# Patient Record
Sex: FEMALE | Race: WHITE | NOT HISPANIC OR LATINO | Employment: PART TIME | URBAN - METROPOLITAN AREA
[De-identification: names, ages, dates, MRNs, and addresses within clinical notes are randomized per-mention and may not be internally consistent; named-entity substitution may affect disease eponyms.]

---
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
## 2022-04-25 ENCOUNTER — TELEPHONE (OUTPATIENT)
Dept: BEHAVIORAL HEALTH | Facility: CLINIC | Age: 22
End: 2022-04-25
Payer: COMMERCIAL

## 2022-04-25 NOTE — TELEPHONE ENCOUNTER
Left VM reminder for pt about their video appt that will be connect via email:  [email or web address] (per request on pt's appt note) for Wednesday 4/27/22 at 9 am.

## 2022-04-27 ENCOUNTER — VIRTUAL VISIT (OUTPATIENT)
Dept: PSYCHOLOGY | Facility: CLINIC | Age: 22
End: 2022-04-27
Attending: FAMILY MEDICINE
Payer: COMMERCIAL

## 2022-04-27 DIAGNOSIS — F98.8 ATTENTION DEFICIT DISORDER, UNSPECIFIED HYPERACTIVITY PRESENCE: Primary | ICD-10-CM

## 2022-04-27 PROCEDURE — 90834 PSYTX W PT 45 MINUTES: CPT | Mod: 95 | Performed by: PSYCHOLOGIST

## 2022-04-27 ASSESSMENT — COLUMBIA-SUICIDE SEVERITY RATING SCALE - C-SSRS
5. HAVE YOU STARTED TO WORK OUT OR WORKED OUT THE DETAILS OF HOW TO KILL YOURSELF? DO YOU INTEND TO CARRY OUT THIS PLAN?: NO
1. IN THE PAST MONTH, HAVE YOU WISHED YOU WERE DEAD OR WISHED YOU COULD GO TO SLEEP AND NOT WAKE UP?: NO
4. HAVE YOU HAD THESE THOUGHTS AND HAD SOME INTENTION OF ACTING ON THEM?: NO
6. HAVE YOU EVER DONE ANYTHING, STARTED TO DO ANYTHING, OR PREPARED TO DO ANYTHING TO END YOUR LIFE?: NO
3. HAVE YOU BEEN THINKING ABOUT HOW YOU MIGHT KILL YOURSELF?: NO
2. HAVE YOU ACTUALLY HAD ANY THOUGHTS OF KILLING YOURSELF IN THE PAST MONTH?: NO

## 2022-04-27 NOTE — PROGRESS NOTES
[facility]   Mental Health & Addiction Services     Progress Note - Initial Visit    Patient  Name:  Makeda Sanches Date: 2022         Service Type: Individual     Visit Start Time: 905  Visit End Time: 95    Visit #: 1    Attendees: Client    Service Modality:  Video Visit:      Provider verified identity through the following two step process.  Patient provided:  Patient     Telemedicine Visit: The patient's condition can be safely assessed and treated via synchronous audio and visual telemedicine encounter.      Reason for Telemedicine Visit: Services only offered telehealth    Originating Site (Patient Location): Patient's home    Distant Site (Provider Location): Provider Remote Setting- Home Office    Consent:  The patient/guardian has verbally consented to: the potential risks and benefits of telemedicine (video visit) versus in person care; bill my insurance or make self-payment for services provided; and responsibility for payment of non-covered services.     Patient would like the video invitation sent by:  My Chart    Mode of Communication:  Video Conference via [employer]    As the provider I attest to compliance with applicable laws and regulations related to telemedicine.       DATA:   Interactive Complexity: No   Crisis: No     Presenting Concerns/  Current Stressors:   ADHD Evaluation       ASSESSMENT:  Mental Status Assessment:  Appearance:   Appropriate   Eye Contact:   Good   Psychomotor Behavior: Normal   Attitude:   Cooperative   Orientation:   All  Speech   Rate / Production: Normal/ Responsive   Volume:  Normal   Mood:    Euthymic  Affect:    Appropriate   Thought Content:  Clear   Thought Form:  Coherent  Goal Directed   Insight:    Good       Safety Issues and Plan for Safety and Risk Management:     [place] Suicide Severity Rating Scale (Short Version)  [place] Suicide Severity Rating (Short Version) 2022   Q1 Wished to be Dead (Past Month) no   Q2  Suicidal Thoughts (Past Month) no   Q3 Suicidal Thought Method no   Q4 Suicidal Intent without Specific Plan no   Q5 Suicide Intent with Specific Plan no   Q6 Suicide Behavior (Lifetime) no   Level of Risk per Screen low risk     Patient denies current fears or concerns for personal safety.  Patient denies current or recent suicidal ideation or behaviors.  Patient denies current or recent homicidal ideation or behaviors.  Patient denies current or recent self injurious behavior or ideation.  Patient denies other safety concerns.  Recommended that patient call 911 or go to the local ED should there be a change in any of these risk factors.  Patient reports there are no firearms in the house.     Diagnostic Criteria:  Rule Out ADHD  WHODAS 2.0 (12 item):   WHODAS 2.0 Total Score 4/26/2022   Total Score 16   Total Score MyChart 16     Intervention:   During today s session, this writer outlined the expectations and purpose of the ADHD Evaluation. The client outlined their reason for referral and symptoms. This writer used the Adult ADHD Evaluation Intake Form to guide the clinical interview; it was finished and general background information was gathered including developmental, relational and chemical use. Their PHQ-9 and YARELY-7 scores were unavailable for review. A second session was scheduled to complete the clinical interview.   Collateral Reports Completed:  Routed note to Care Team Member(s)      PLAN: (Homework, other):  1. Provider will continue Diagnostic Assessment.  2.  Suicide Risk and Safety Concerns were assessed for Makeda Sanches.          Chari Camacho, PhD LP  April 27, 2022

## 2022-05-01 ENCOUNTER — HEALTH MAINTENANCE LETTER (OUTPATIENT)
Age: 22
End: 2022-05-01

## 2022-05-11 ENCOUNTER — VIRTUAL VISIT (OUTPATIENT)
Dept: PSYCHOLOGY | Facility: CLINIC | Age: 22
End: 2022-05-11
Payer: COMMERCIAL

## 2022-05-11 DIAGNOSIS — F98.8 ATTENTION DEFICIT DISORDER, UNSPECIFIED HYPERACTIVITY PRESENCE: Primary | ICD-10-CM

## 2022-05-11 PROCEDURE — 90791 PSYCH DIAGNOSTIC EVALUATION: CPT | Mod: 95 | Performed by: PSYCHOLOGIST

## 2022-06-26 ENCOUNTER — HEALTH MAINTENANCE LETTER (OUTPATIENT)
Age: 22
End: 2022-06-26

## 2022-07-01 ENCOUNTER — TELEPHONE (OUTPATIENT)
Dept: PSYCHOLOGY | Facility: CLINIC | Age: 22
End: 2022-07-01

## 2022-07-06 DIAGNOSIS — F98.8 ATTENTION DEFICIT DISORDER, UNSPECIFIED HYPERACTIVITY PRESENCE: ICD-10-CM

## 2022-07-06 DIAGNOSIS — F90.0 ATTENTION DEFICIT HYPERACTIVITY DISORDER (ADHD), PREDOMINANTLY INATTENTIVE TYPE: ICD-10-CM

## 2022-07-06 RX ORDER — DEXTROAMPHETAMINE SACCHARATE, AMPHETAMINE ASPARTATE MONOHYDRATE, DEXTROAMPHETAMINE SULFATE AND AMPHETAMINE SULFATE 2.5; 2.5; 2.5; 2.5 MG/1; MG/1; MG/1; MG/1
10 CAPSULE, EXTENDED RELEASE ORAL DAILY
Qty: 90 CAPSULE | Refills: 0 | Status: SHIPPED | OUTPATIENT
Start: 2022-07-06 | End: 2023-02-08

## 2022-07-06 RX ORDER — DEXTROAMPHETAMINE SACCHARATE, AMPHETAMINE ASPARTATE MONOHYDRATE, DEXTROAMPHETAMINE SULFATE AND AMPHETAMINE SULFATE 7.5; 7.5; 7.5; 7.5 MG/1; MG/1; MG/1; MG/1
30 CAPSULE, EXTENDED RELEASE ORAL DAILY
Qty: 90 CAPSULE | Refills: 0 | Status: SHIPPED | OUTPATIENT
Start: 2022-07-06 | End: 2023-02-08

## 2022-07-06 NOTE — TELEPHONE ENCOUNTER
Patient needs to be seen every 6 months for me to continue filling this medication.  Please help her set an appointment.  Thank you.

## 2022-07-11 ENCOUNTER — OFFICE VISIT (OUTPATIENT)
Dept: FAMILY MEDICINE | Facility: CLINIC | Age: 22
End: 2022-07-11
Payer: COMMERCIAL

## 2022-07-11 VITALS
SYSTOLIC BLOOD PRESSURE: 134 MMHG | HEIGHT: 67 IN | WEIGHT: 236.5 LBS | HEART RATE: 97 BPM | DIASTOLIC BLOOD PRESSURE: 77 MMHG | TEMPERATURE: 97.8 F | BODY MASS INDEX: 37.12 KG/M2 | RESPIRATION RATE: 16 BRPM

## 2022-07-11 DIAGNOSIS — Z79.899 ENCOUNTER FOR LONG-TERM (CURRENT) USE OF MEDICATIONS: ICD-10-CM

## 2022-07-11 DIAGNOSIS — E55.9 VITAMIN D DEFICIENCY: ICD-10-CM

## 2022-07-11 DIAGNOSIS — Z11.3 SCREENING FOR STDS (SEXUALLY TRANSMITTED DISEASES): ICD-10-CM

## 2022-07-11 DIAGNOSIS — F98.8 ATTENTION DEFICIT DISORDER, UNSPECIFIED HYPERACTIVITY PRESENCE: ICD-10-CM

## 2022-07-11 DIAGNOSIS — Z00.129 ENCOUNTER FOR ROUTINE CHILD HEALTH EXAMINATION W/O ABNORMAL FINDINGS: Primary | ICD-10-CM

## 2022-07-11 DIAGNOSIS — Z79.899 CONTROLLED SUBSTANCE AGREEMENT SIGNED: ICD-10-CM

## 2022-07-11 LAB — CREAT UR-MCNC: 390 MG/DL

## 2022-07-11 PROCEDURE — 99395 PREV VISIT EST AGE 18-39: CPT | Mod: 25 | Performed by: FAMILY MEDICINE

## 2022-07-11 PROCEDURE — 96127 BRIEF EMOTIONAL/BEHAV ASSMT: CPT | Performed by: FAMILY MEDICINE

## 2022-07-11 PROCEDURE — 87491 CHLMYD TRACH DNA AMP PROBE: CPT | Performed by: FAMILY MEDICINE

## 2022-07-11 PROCEDURE — 80307 DRUG TEST PRSMV CHEM ANLYZR: CPT | Performed by: FAMILY MEDICINE

## 2022-07-11 PROCEDURE — 99173 VISUAL ACUITY SCREEN: CPT | Mod: 59 | Performed by: FAMILY MEDICINE

## 2022-07-11 PROCEDURE — 87591 N.GONORRHOEAE DNA AMP PROB: CPT | Performed by: FAMILY MEDICINE

## 2022-07-11 PROCEDURE — 92551 PURE TONE HEARING TEST AIR: CPT | Performed by: FAMILY MEDICINE

## 2022-07-11 SDOH — ECONOMIC STABILITY: INCOME INSECURITY: IN THE LAST 12 MONTHS, WAS THERE A TIME WHEN YOU WERE NOT ABLE TO PAY THE MORTGAGE OR RENT ON TIME?: NO

## 2022-07-11 NOTE — LETTER
[facility]  07/11/22  Patient: Makeda Sanches  YOB: 2000  Medical Record Number: [id number]                                                                                  Non-Opioid Controlled Substance Agreement    This is an agreement between you and your provider regarding safe and appropriate use of controlled substances prescribed by your care team. Controlled substances are?medicines that can cause physical and mental dependence (abuse).     There are strict laws about having and using these medicines. We here at [facility] are  committed to working with you in your efforts to get better. To support you in this work, we'll help you schedule regular office appointments for medicine refills. If we must cancel or change your appointment for any reason, we'll make sure you have enough medicine to last until your next appointment.     As a Provider, I will:     Listen carefully to your concerns while treating you with respect.     Recommend a treatment plan that I believe is in your best interest and may involve therapies other than medicine.      Talk with you often about the possible benefits and the risk of harm of any medicine that we prescribe for you.    Assess the safety of this medicine and check how well it works.      Provide a plan on how to taper (discontinue or go off) using this medicine if the decision is made to stop its use.      ::  As a Patient, I understand controlled substances:       Are prescribed by my care provider to help me function or work and manage my condition(s).?    Are strong medicines and can cause serious side effects.       Need to be taken exactly as prescribed.?Combining controlled substances with certain medicines or chemicals (such as illegal drugs, alcohol, sedatives, sleeping pills, and benzodiazepines) can be dangerous or even fatal.? If I stop taking my medicines suddenly, I may have severe withdrawal symptoms.      The risks, benefits, and side effects of these medicine(s) were explained to me. I agree that:    1. I will take part in other treatments as advised by my care team. This may be psychiatry or counseling, physical therapy, behavioral therapy, group treatment or a referral to specialist.    2. I will keep all my appointments and understand this is part of the monitoring of controlled substances.?My care team may require an office visit for EVERY controlled substance refill. If I miss appointments or don t follow instructions, my care team may stop my medicine    3. I will take my medicines as prescribed. I will not change the dose or schedule unless my care team tells me to. There will be no refills if I run out early.      4. I may be asked to come to the clinic and complete a urine drug test or complete a pill count. If I don t give a urine sample or participate in a pill count, the care team may stop my medicine.    5. I will only receive controlled substance prescriptions from this clinic. If I am treated by another provider, I will tell them that I am taking controlled substances and that I have a treatment agreement with this provider. I will inform my [facility] care team within one business day if I am given a prescription for any controlled substance by another healthcare provider. My [facility] care team can contact other providers and pharmacists about my use of any medicines.    6. It is up to me to make sure that I don't run out of my medicines on weekends or holidays.?If my care team is willing to refill my prescription without a visit, I must request refills only during office hours. Refills may take up to 3 business days to process. I will use one pharmacy to fill all my controlled substance prescriptions. I will notify the clinic about any changes to my insurance or medicine availability.    7. I am responsible for my prescriptions. If the medicine/prescription is lost, stolen or  destroyed, it will not be replaced.?I also agree not to share controlled substance medicines with anyone.     8. I am aware I should not use any illegal or recreational drugs. I agree not to drink alcohol unless my care team says I can.     9. If I enroll in the [place] Medical Cannabis program, I will tell my care team before my next refill.    10. I will tell my care team right away if I become pregnant, have a new medical problem treated outside of my regular clinic, or have a change in my medicines.     11. I understand that this medicine can affect my thinking, judgment and reaction time.? Alcohol and drugs affect the brain and body, which can affect the safety of my driving. Being under the influence of alcohol or drugs can affect my decision-making, behaviors, personal safety and the safety of others. Driving while impaired (DWI) can occur if a person is driving, operating or in physical control of a car, motorcycle, boat, snowmobile, ATV, motorbike, off-road vehicle or any other motor vehicle ([place] Statute 169A.20). I understand the risk if I choose to drive or operate any vehicle or machinery.    I understand that if I do not follow any of the conditions above, my prescriptions or treatment may be stopped or changed.   I agree that my provider, clinic care team and pharmacy may work with any city, state or federal law enforcement agency that investigates the misuse, sale or other diversion of my controlled medicine. I will allow my provider to discuss my care with, or share a copy of, this agreement with any other treating provider, pharmacy or emergency room where I receive care.     I have read this agreement and have asked questions about anything I did not understand.    ________________________________________________________  Patient Signature - Makeda Sanches     ___________________                   Date     ________________________________________________________  Provider Signature - Anna  A Juan, MD       ___________________                   Date     ________________________________________________________  Witness Signature (required if provider not present while patient signing)          ___________________                   Date

## 2022-07-11 NOTE — PATIENT INSTRUCTIONS
Recommend 3 dairy servings a day or calcium with vitamin D twice a day with food.    Adderal in the am if needed.    Melatonin 2-3 mg at bedtime if needed for sleep.    No caffeine or Sudafed while on Adderall.    Tdap shot due June 2023.    Covid booster if you wish.    Set office visit in 6 months for rmed check.    Updated controlled substance agreement and urine tox for Adderall ER 30 mg +10 mg tab daily as needed.    Pap and breast exam next year with physical.       Patient Education    [employer] HANDOUT- PATIENT  18 THROUGH 21 YEAR VISITS  Here are some suggestions from [employer] experts that may be of value to your family.     HOW YOU ARE DOING  Enjoy spending time with your family.  Find activities you are really interested in, such as sports, theater, or volunteering.  Try to be responsible for your schoolwork or work obligations.  Always talk through problems and never use violence.  If you get angry with someone, try to walk away.  If you feel unsafe in your home or have been hurt by someone, let us know. Hotlines and community agencies can also provide confidential help.  Talk with us if you are worried about your living or food situation. Community agencies and programs such as SNAP can help.  Don t smoke, vape, or use drugs. Avoid people who do when you can. Talk with us if you are worried about alcohol or drug use in your family.    YOUR DAILY LIFE  Visit the dentist at least twice a year.  Brush your teeth at least twice a day and floss once a day.  Be a healthy eater.  Have vegetables, fruits, lean protein, and whole grains at meals and snacks.  Limit fatty, sugary, salty foods that are low in nutrients, such as candy, chips, and ice cream.  Eat when you re hungry. Stop when you feel satisfied.  Eat breakfast.  Drink plenty of water.  Make sure to get enough calcium every day.  Have 3 or more servings of low-fat (1%) or fat-free milk and other low-fat dairy products, such as yogurt and  cheese.  Women: Make sure to eat foods rich in folate, such as fortified grains and dark- green leafy vegetables.  Aim for at least 1 hour of physical activity every day.  Wear safety equipment when you play sports.  Get enough sleep.  Talk with us about managing your health care and insurance as an adult.    YOUR FEELINGS  Most people have ups and downs. If you are feeling sad, depressed, nervous, irritable, hopeless, or angry, let us know or reach out to another health care professional.  Figure out healthy ways to deal with stress.  Try your best to solve problems and make decisions on your own.  Sexuality is an important part of your life. If you have any questions or concerns, we are here for you.    HEALTHY BEHAVIOR CHOICES  Avoid using drugs, alcohol, tobacco, steroids, and diet pills. Support friends who choose not to use.  If you use drugs or alcohol, let us know or talk with another trusted adult about it. We can help you with quitting or cutting down on your use.  Make healthy decisions about your sexual behavior.  If you are sexually active, always practice safe sex. Always use birth control along with a condom to prevent pregnancy and sexually transmitted infections.  All sexual activity should be something you want. No one should ever force or try to convince you.  Protect your hearing at work, home, and concerts. Keep your earbud volume down.    STAYING SAFE  Always be a safe and cautious .  Insist that everyone use a lap and shoulder seat belt.  Limit the number of friends in the car and avoid driving at night.  Avoid distractions. Never text or talk on the phone while you drive.  Do not ride in a vehicle with someone who has been using drugs or alcohol.  If you feel unsafe driving or riding with someone, call someone you trust to drive you.  Wear helmets and protective gear while playing sports. Wear a helmet when riding a bike, a motorcycle, or an ATV or when skiing or skateboarding.  Always  use sunscreen and a hat when you re outside.  Fighting and carrying weapons can be dangerous. Talk with your parents, teachers, or doctor about how to avoid these situations.        Consistent with Bright Futures: Guidelines for Health Supervision of Infants, Children, and Adolescents, 4th Edition  For more information, go to https://brightfutures.aap.org.

## 2022-07-11 NOTE — PROGRESS NOTES
Makeda Sanches is 22 year old, here for a preventive care visit.    Assessment & Plan       ICD-10-CM    1. Encounter for routine child health examination w/o abnormal findings  Z00.129 BEHAVIORAL/EMOTIONAL ASSESSMENT (20276)     SCREENING TEST, PURE TONE, AIR ONLY     SCREENING, VISUAL ACUITY, QUANTITATIVE, BILAT   2. Screening for STDs (sexually transmitted diseases)  Z11.3 NEISSERIA GONORRHOEA PCR     CHLAMYDIA TRACHOMATIS PCR   3. Encounter for long-term (current) use of medications  Z79.899 [id number] - Urine Drug Confirmation Panel (Comprehensive)     [id number] - Urine Drug Confirmation Panel (Comprehensive)   4. Vitamin D deficiency  E55.9    5. Attention deficit disorder, unspecified hyperactivity presence  F98.8    6. Controlled substance agreement signed-CSA and urine tox done for Adderall XR 30 mg and 10 mg 7/11/2022  Z79.899      Recommend 3 dairy servings a day or calcium with vitamin D twice a day with food.    Adderal in the am if needed.    Melatonin 2-3 mg at bedtime if needed for sleep.    No caffeine or Sudafed while on Adderall.    Tdap shot due June 2023.    Covid booster if you wish.    Set office visit in 6 months for rmed check.    Updated controlled substance agreement and urine tox for Adderall ER 30 mg +10 mg tab daily as needed.    Pap and breast exam next year with physical.    Growth        Height: Normal , Weight: Obesity (BMI 95-99%)        Immunizations     Vaccines up to date.  MenB Vaccine series already completed.    Anticipatory Guidance    Reviewed age appropriate anticipatory guidance.   The following topics were discussed:  SOCIAL/ FAMILY:    Increased responsibility    Future plans/ College  NUTRITION:    Calcium   HEALTH / SAFETY:    Dental care    Seat belts    Firearms  SEXUALITY:    Safe sex/ STDs          Referrals/Ongoing Specialty Care  Verbal referral for routine dental care    Follow Up      Return in 1 year (on 7/11/2023) for Preventive Care visit.    Subjective     ADHD: Med does help her focus.  If she takes the med later in day hard to sleep.  Takes as needed.   If she has not taken the med in a few days it is sometimes simulating.  Does not drink caffeine.  She is currently undergoing a reassessment with psychiatry to ensure she still carries a diagnosis of ADHD and we are on the right treatment.      Additional Questions 7/11/2022   Do you have any questions today that you would like to discuss? No             Social 7/11/2022   Who do you live with? Family   Have you experienced any stressful events recently? None   In the past 12 months, has lack of transportation kept you from medical appointments or from getting medications? No   In the last 12 months, was there a time when you were not able to pay the mortgage or rent on time? No   In the last 12 months, was there a time when you did not have a steady place to sleep or slept in a shelter (including now)? No       Health Risks/Safety 7/11/2022   Do you always wear a seat belt? Yes   Do you wear a helmet for bicyle, rollerblades, skatebard, scooter, skiing/snowboarding, ATV/snowmobile, motorcycle?  (!) NO          TB Screening 7/11/2022   Since your last Well Child visit, have any of your family members or close contacts had tuberculosis or a positive tuberculosis test?  No   Since your last check-up, have you or any of your family members or close contacts traveled or lived outside of the [place]? No   Since your last check-up, have you lived in a high-risk group setting like a correctional facility, health care facility, homeless shelter, or refugee camp? No        Dyslipidemia Screening 7/11/2022   Have any of your parents or grandparents had a stroke or heart attack before age 55 for males or before age 65 for females? No   Do either of your parents have high cholesterol or currently taking medications to treat? (!) YES    Risk Factors: None    No flowsheet data found.  Dental Fluoride Varnish:   No,  parent/guardian declines fluoride varnish.  Reason for decline: Recent/Upcoming dental appointment  Diet 7/11/2022   Do you have questions about your eating?  No   Do you have questions about your weight?  No   What do you regularly drink? Water, Cow's Milk, (!) JUICE, (!) POP, (!) SPORTS DRINKS   What type of water? (!) BOTTLED   Do you think you eat healthy foods? Yes   Do you get at least 3 servings of food or beverages that have calcium each day (dairy, green leafy vegetables, etc.)? Yes   How would you describe your diet?  No restrictions   Within the past 12 months, you worried that your food would run out before you got money to buy more. Never true   Within the past 12 months, the food you bought just didn't last and you didn't have money to get more. Never true       Activity 7/11/2022   On average, how many days per week do you engage in moderate to strenuous exercise (like walking fast, running, jogging, dancing, swimming, biking, or other activities that cause a light or heavy sweat)? 4 days   On average, how many minutes do you engage in exercise at this level? 60 minutes   What do you do for exercise? Walking   What activities are you involved with? None     Media Use 7/11/2022   How many hours per day are you viewing a screen?  3 or 4     Sleep 7/11/2022   Do you have any trouble with sleep? (!) DIFFICULTY FALLING ASLEEP     Vision/Hearing 7/11/2022   Do you have any concerns about your hearing or vision?  (!) HEARING CONCERNS     Vision Screen  Vision Screen Details  Does the patient have corrective lenses (glasses/contacts)?: No  No Corrective Lenses, PLUS LENS REQUIRED: Pass  Vision Acuity Screen  Vision Acuity Tool: Saleem  RIGHT EYE: 10/10 (20/20)  LEFT EYE: 10/10 (20/20)  Is there a two line difference?: No  Vision Screen Results: Pass    Hearing Screen  RIGHT EAR  1000 Hz on Level 40 dB (Conditioning sound): Pass  1000 Hz on Level 20 dB: Pass  2000 Hz on Level 20 dB: Pass  4000 Hz on Level 20  "dB: Pass  6000 Hz on Level 20 dB: Pass  8000 Hz on Level 20 dB: Pass  LEFT EAR  8000 Hz on Level 20 dB: Pass  6000 Hz on Level 20 dB: Pass  4000 Hz on Level 20 dB: Pass  2000 Hz on Level 20 dB: Pass  1000 Hz on Level 20 dB: Pass  500 Hz on Level 25 dB: Pass  RIGHT EAR  500 Hz on Level 25 dB: Pass  Results  Hearing Screen Results: Pass      School 7/11/2022   Are you in school? Yes   What school do you attend?  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    AMB [facility] MENSES SECTION 7/11/2022   What are your periods like?  (!) IRREGULAR              Objective     Exam  /77 (BP Location: Left arm, Patient Position: Sitting, Cuff Size: Adult Large)   Pulse 97   Temp 97.8  F (36.6  C) (Oral)   Resp 16   Ht 1.702 m (5' 7\")   Wt 107.3 kg (236 lb 8 oz)   LMP 06/27/2022 (Approximate)   BMI 37.04 kg/m    Facility age limit for growth percentiles is 20 years.  Facility age limit for growth percentiles is 20 years.  Facility age limit for growth percentiles is 20 years.  Growth percentile SmartLinks can only be used for patients less than 20 years old.  Physical Exam  GENERAL: Active, alert, in no acute distress.  SKIN: Clear. No significant rash, abnormal pigmentation or lesions  HEAD: Normocephalic  EYES: Pupils equal, round, reactive, Extraocular muscles intact. Normal conjunctivae.  EARS: Normal canals. Tympanic membranes are normal; gray and translucent.  NOSE: Normal without discharge.  MOUTH/THROAT: Clear. No oral lesions. Teeth " without obvious abnormalities.  NECK: Supple, no masses.  No thyromegaly.  LYMPH NODES: No adenopathy  LUNGS: Clear. No rales, rhonchi, wheezing or retractions  HEART: Regular rhythm. Normal S1/S2. No murmurs. Normal pulses.  ABDOMEN: Soft, non-tender, not distended, no masses or hepatosplenomegaly. Bowel sounds normal.   NEUROLOGIC: No focal findings. Cranial nerves grossly intact: DTR's normal. Normal gait, strength and tone  BACK: Spine is straight, no scoliosis.  EXTREMITIES: Full range of motion, no deformities  : Deferred        Anna Martinez MD  [facility]

## 2022-07-12 LAB
C TRACH DNA SPEC QL NAA+PROBE: NEGATIVE
N GONORRHOEA DNA SPEC QL NAA+PROBE: NEGATIVE

## 2022-07-13 LAB
AMPHET UR CFM-MCNC: ABNORMAL NG/ML
AMPHET/CREAT UR: 2892 NG/MG {CREAT}

## 2022-07-27 ENCOUNTER — TELEPHONE (OUTPATIENT)
Dept: PSYCHOLOGY | Facility: CLINIC | Age: 22
End: 2022-07-27

## 2022-07-29 ENCOUNTER — VIRTUAL VISIT (OUTPATIENT)
Dept: PSYCHOLOGY | Facility: CLINIC | Age: 22
End: 2022-07-29
Payer: COMMERCIAL

## 2022-07-29 ENCOUNTER — FCC EXTENDED DOCUMENTATION (OUTPATIENT)
Dept: PSYCHOLOGY | Facility: CLINIC | Age: 22
End: 2022-07-29

## 2022-07-29 DIAGNOSIS — F90.2 ATTENTION DEFICIT HYPERACTIVITY DISORDER (ADHD), COMBINED TYPE: Primary | ICD-10-CM

## 2022-07-29 PROCEDURE — 96130 PSYCL TST EVAL PHYS/QHP 1ST: CPT | Mod: 95 | Performed by: PSYCHOLOGIST

## 2022-07-29 PROCEDURE — 96131 PSYCL TST EVAL PHYS/QHP EA: CPT | Mod: 95 | Performed by: PSYCHOLOGIST

## 2022-07-29 NOTE — CONFIDENTIAL NOTE
[facility]  Attention Deficit Hyperactivity Disorder Evaluation    Name: Makeda Sanches   : 2000    Examined By: Chari Camacho PsyD, LP    Sources of Information & Assessment Measures:    BFIS-LF: Other-Report, Completed by Shruthi Sanches (Sister), Date Unknown    BDEFS-LF: Other-Report, Completed by Shruthi Sanches (Sister), Date Unknown    BAARS-IV: Other-Report: Current Symptoms, Completed by Shruthi Sanches (Sister), Date Unknown     BAARS-IV: Other-Report: Childhood Symptoms, Completed by Shruthi Sanches (Sister), Date Unknown     CNS Vital Signs, Completed 2022    [facility], Medical Chart, Reviewed 2022    [place] Multiphasic Personality Inventory-2nd Edition, Administered 2022    Patient Health Questionnaire 9- Item Scale, Completed 2022    Generalized Anxiety Disorder 7-Item Scale, Completed 2022     BFIS-LF: Self-Report, Dated 2022    BDEFS-LF: Self-Report, Dated 2022    BAARS-IV: Self-Report: Current Symptoms, Dated 2022     BAARS-IV: Self-Report: Childhood Symptoms, Dated 2022    PROMIS Global-10, Completed 2022    Clinical Interviews, Conducted 2022 & 2022    World Health Organization Disability Assessment Schedule 2.0, Completed 2022    CAGE AID, Completed 2022    Identifying Information:  Ms. Sanches is a 21-year-old, woman; she spoke English, female pronouns were used, and she identified no cultural considerations for treatment. The clinical interviews took place via telemedicine due to the Corona Virus outbreak. This writer gathered her background information at the time of each session.     Client's Statement of Presenting Concern:  Ms. Sanches was referred for an Attention Deficit Hyperactivity Disorder Evaluation by Dr. Kate Acosta on 2021 by due to poor concentration and inattention. The purpose of the evaluation was to provide an opinion regarding possible mental health issues or  deficits and treatment recommendations.     History of Presenting Concern:  Ms. Sanches indicated she is not engaged in individual therapy yet has been prescribed Adderall. She asserted she has not been diagnosed with any mental health issues; a diagnostic assessment has not been completed. She acknowledged she was not doing well in her classes and there was  no explanation for it  so she and her provider wondered if she suffered from attention deficit. She stated she struggles to remember and complete tasks. She reported that symptoms impair her functioning at school and work. She highlighted that when she started taking stimulant medication her grades improved and she felt  more motivation to get things done.     Background Information:  Developmental History  Ms. Sanches was the second of a pair of twins born to her parents. She was raised by her parents in [place], [place] along with two younger sisters. She described her childhood as  pretty good.  She highlighted that  nothing very traumatic stands out.  She asserted that her parents seemed to have a  very good.  She denied witnessing incidents of domestic violence. She recalled playing with her sisters a lot and having  everything [she] could ask for.  She stated she felt loved and supported by her parents. She reported that when the girls misbehaved, they were sent to their rooms. She denied experiencing or witnessing childhood abuse.     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 She reported that the couple has been dating for over a year. She described the relationship as  pretty good.  She highlighted she has not had  anything negative happen  in the relationship. She denied incidents of domestic violence. She acknowledged she can be  really annoying and weird  at times, and he  puts up with it.      Relationships with Family Members  Ms. Sanches indicated she  continues to reside with her parents and youngest sister. She stated that her relationships with her parents have been  up and down  because they have set more expectations. She highlighted that her relationships with her sisters remain close. She asserted that her twin sister is currently enrolled at [employer].     Relationships with Peers  Ms. Sanches identified five individuals whom she calls a close friend. She asserted that her friends have all moved away for college, but she has contact with them daily via social media. She denied feeling lonely.     Educational & Occupational History  Ms. Sanches graduated from [employer] in the year 2020. She reported she earned  As and Bs with a few mixed in Cs.  She acknowledged her grades dropped slightly during the COVID-19 pandemic and classes were held virtually. She stated she struggled with social studies courses while she excelled in mathematics classes. She highlighted she enrolled in an honors [place] course and did well. She asserted she got along  pretty good  with teachers. She acknowledged difficulty getting along with peers and switching friend groups. She recalled not  agreeing with things they would do  and the relationships not being as close. She reported she was a member of the hockey and softball teams.     Following high school, Ms. Sanches planned to attend [employer]. She reported that when the time came to go,  it didn t feel like the right time.  She asserted she took a year off and worked as a  and . She indicated that in the fall of 2021, she enrolled at [employer]. She stated she lives at home. She reported that the first semester went  okay.  She recalled earnings As, Bs and Cs for grades. She acknowledged having difficulty getting to class and motivated to start homework. She disclosed she associated with peers but did not establish any new friendships. She indicated she did better  during the second semester because she liked her classes. She added that when she takes her medication, she gets up and goes to class as well as gets chores done at home. She stated she was taking generals and has not declared a major. She added she continues to work as a  part-time.     Mental Health History:  Ms. Sanches stated that anxiety symptoms emerged in high school. She recalled feeling anxious and worrying about not completing homework assignments. She asserted she  worries about money quite a bit  but not more than other college students. She denied symptoms of social anxiety or panic. She indicated she has never experienced a depressive episode. She reported no history of suicidal ideations or self-injurious behaviors.     Patient's Strengths and Limitations  Patient identified the following strengths or resources that will help them succeed in treatment: motivation. Things that may interfere with their success in treatment include mental health symptoms.     Health/Medical History:  Ms. Sanches described her physical health as average. She stated she has  high blood pressure sometimes.  She denied being diagnosed with a chronic medication condition or pain. She outlined no concerns regarding sexual or reproductive health. She acknowledged that her sleep is  not great.  She indicated she has difficulty falling asleep because she is playing on her phone and her mind is racing. She added that she has been experiencing sleep paralysis. She reported she feels rested upon waking. She admitted that her diet  could be better because a lot of it is take out.  She denied a history of disordered eating. She indicated she has cut caffeine with use of stimulant medication.     Patient does report a family history of mental health concerns. Patient reports family history includes Hypertension in her father and paternal grandmother; Hypothyroidism in her mother.      Patient reports current meds as         Medication Sig     amphetamine-dextroamphetamine (ADDERALL XR) 10 MG 24 hr capsule Take 1 capsule (10 mg) by mouth daily     amphetamine-dextroamphetamine (ADDERALL XR) 30 MG 24 hr capsule Take 1 capsule (30 mg) by mouth daily       Medication Adherence  Patient reports taking prescribed medications as prescribed.     Patient Allergies    No Known Allergies     Past Medical History   No past medical history on file.      Current Mental Status Exam:   Appearance:                 Appropriate    Eye Contact:                 Good   Psychomotor:               Normal       Gait / station:            Not assessed  Attitude / Demeanor:    Cooperative   Speech      Rate / Production:    Normal, Responsive      Volume:                    Normal      Language:                 Intact  Mood:                           Normal  Affect:                           Appropriate    Thought Content:         Clear   Thought Process:          Coherent, Goal Directed       Associations:            No loosening of associations  Insight:                          Good   Judgment:                     Intact   Orientation:                  All  Attention/concentration:         Good    Substance Use History:  Ms. Sanches stated she consumed alcohol and smoked cannabis for the first time at the age of 18 years old. As a high school roxanna and senior, she recalled drinking every other weekend. She indicated that her alcohol use decreased when her friends moved away. She denied a problematic pattern of alcohol use. She asserted she has smoked and ingested marijuana a handful of times. She reported she has never abused other illegal drugs or prescription medications. She added she vaped nicotine for a year but she since stopped.     CAGE AID  1. Have you felt you ought to cut down on your drinking or drug use? No   2. Have people annoyed you by criticizing your drinking or drug use? No   3. Have you felt bad or guilty about your drinking or drug use?  No   4. Have you ever had a drink or used drugs first thing in the morning to steady your nerves or to get rid of a hangover (eye opener)? No   CAGE-AID SCORE (range: 0 - 4) 0 (A total score of 2 or greater is considered clinically significant)     Trauma History:  Ms. Sanches denied witnessing or experiencing domestic violence, childhood abuse, a sexual assault, violent crime, or life-threatening event.     Risk Taking Behaviors:  Ms. Sanches reported a history of the following risk-taking behaviors: excessive spending.     Motor Vehicle Operation:  Ms. Sanches stated she was issued her 's license at the age of 18 years old. She denied ever being cited for speeding yet was cited for driving without insurance. She reported she easily gets lost and misses turns. She expressed the belief that others feel safe riding in the car with her while she is driving.      Safety Assessment:   Current Safety Concerns  Patient denies current homicidal ideation and behaviors.  Patient denies current self-injurious ideation and behaviors.    Patient denied risk behaviors associated with substance use.  Patient denies any high-risk behaviors associated with mental health symptoms.  Patient reports the following current concerns for their personal safety: None.  Patient reports there are not firearms in the house.          History of Safety Concerns  Patient denied a history of homicidal ideation.     Patient denied a history of personal safety concerns.    Patient denied a history of assaultive behaviors.    Patient denied a history of sexual assault behaviors.     Patient denied a history of risk behaviors associated with substance use.  Patient denies any history of high-risk behaviors associated with mental health symptoms.    Patient reports the following protective factors: forward or future oriented thinking; safe and stable environment; living with other people; structured day; effective problem-solving skills; commitment  to well-being; positive social skills; healthy fear of risky behaviors or pain     Risk Plan:  See Recommendations for Safety and Risk Management Plan     Review of Symptoms per patient report:  Depression:     No symptoms  Hollie:             No Symptoms  Psychosis:       No Symptoms  Anxiety:          Sleep disturbance and Ruminations  Panic:              No symptoms  Post-Traumatic Stress Disorder:  No Symptoms   Eating Disorder:          No Symptoms  ADHD:           Inattentive, Poor task completion, Distractibility, Forgetful, Impulsive, Restlessness/fidgety and Hyperverbal  Conduct Disorder:       No symptoms  Autism Spectrum Disorder:     No symptoms  Obsessive Compulsive Disorder:       No Symptoms     Patient reports the following compulsive behaviors and treatment history: Picking - has not had treatment.    Functional Status:  Ms. Sanches reported that symptoms have caused reduced functional status in the following areas: academic performance, educational activities, health maintenance, management of the household and or completion of tasks, money management, relationship(s), self-care, social interactions and work / vocational responsibilities.     Recommendations:   The client identified no relevant Evangelical, ethnic or cultural issues which may impact the assessment process.      services were not indicated.     Modifications to assist communication were not indicated.     A Release of Information was not needed at this time.     Report to child/adult protection services was not applicable.     The client will have open access to the mental health medical record.    The client was given self and collaborative rating scales to be completed prior to the second appointment. Depression and anxiety rating scales were completed. Copies of school records were not requested. Additional appointments will be scheduled as needed.     Assessment Measures:  World Health Organization Disability Assessment  Schedule 2.0   The WHODAS 2.0 short version is a 12-item measure that screens disability in adults age 18 years and older. Each self-administered item asks the individual to rate how much difficulty he or she has had in specific areas of functioning during the past 30 days.    In the past 30 days, how much difficulty did you have  in:    S1 Standing for long periods such as 30 minutes None   S2 Taking care of your household responsibilities None   S3 Learning a new task, for example, learning how to get to a new place? None     S4 Joining in community activities (for example, festivities, Jew or other activities) in the same way as anyone else can? None   S5 How much have you been emotionally affected by your health problems? Mild   S6 Concentrating on doing something for ten minutes? Moderate   S7 Walking a long distance such as a kilometer [or equivalent]? None   S8 Washing your whole body? None   S9 Getting dressed? None   S10 Dealing with people you do not know? None   S11 Maintaining a friendship? None   S12 Your day-to-day work? Mild   H1 Overall, in the past 30 days, how many days were these difficulties present? 10   H2 In the past 30 days, for how many days were you totally unable to carry out your usual activities or work because of any health condition? 0   H3 In the past 30 days, not counting the days that you were totally unable, for how many days did you cut back or reduce your usual activities or work because of any health condition? 0   WHODAS 2.0 12 Item scoring (range: 0 - 48) 16     PROMIS Global-10  Originally published in 2009 in Quality of Life Research as a subset of the (PROMIS), the PROMIS Global-10 is a gauge of general healthcare-related quality of life. It was developed by the [place] [employer] to evaluate HRQoL and is contrasted against [place] normative scores and was designed to be a  bottom-line  assessment of a patient s health that can be used  for a wide variety of diseases. The results of the questions are used to calculate two summary scores: a Global Physical Health Score and a Global Mental Health score. Higher scores are indicative of a healthier patient. The possible score ranges from 0 to 20 points in each case. 0 points represent the patient s most severe physical and/or mental impairment, while 20 points represent the best possible state of health.      In general, would you say your health is: Good   In general, would you say your quality of life is: Very good   In general, how would you rate your physical health? Good   In general, how would you rate your mental health, including your mood and your ability to think? Very good   In general, how would you rate your satisfaction with your social activities and relationships? Very good   In general, please rate how well you carry out your usual social activities and roles. (This includes activities at home, at work and in your community, and responsibilities as a parent, child, spouse, employee, friend, etc.) Good   To what extent are you able to carry out your everyday physical activities such as walking, climbing stairs, carrying groceries, or moving a chair? Completely   In the past 7 days    How often have you been bothered by emotional problems such as feeling anxious, depressed or irritable? Rarely   How would you rate your fatigue on average? Moderate   How would you rate your pain on average?   0 = No Pain  to  10 = Worst Imaginable Pain 0     Patient Health Questionnaire 9- Item Scale  The PHQ-9 is a multipurpose instrument for screening, diagnosing, monitoring and measuring the severity of depression. It incorporates the diagnostic criterion for a depressive disorder within a brief self-report tool.     Ms. Sanches completed the PHQ-9 on 06/22/2022. She earned score a score of 10 which placed her within the mild range. She reported experiencing little interest in activities, feeling  "tired/restless, trouble sleeping, poorly eating, having low self-esteem, and difficulty concentrating.      Generalized Anxiety Disorder 7-Item Scale   The YARELY-7 is a multipurpose instrument for screening, diagnosing, monitoring and measuring the severity of anxiety. It incorporates the diagnostic criterion for Generalized Anxiety Disorder yet is sensitive to other anxiety disorders within a brief self-report tool.       Ms. Sanches completed the YARELY-7 on 06/22/2022. She earned a score of 10 which placed her within the mild range. She reported feeling anxious, having difficulty controlling a variety of worries, trouble relaxing, and struggling to be still.      [place] Adult ADHD Rating Scale-IV: Self and Other Reports  The BAARS-IV assesses for symptoms of Attention Deficit Hyperactivity Disorder (ADHD) that are experienced in one's daily life. This assessment measure includes self and collateral rating scales designed to provide information regarding current and childhood symptoms of ADHD including inattention, hyperactivity, and impulsivity. Self-report scores are reported as percentiles. Scores at the 76th-83rd percentile are considered marginal, scores at the 84th-92nd percentile are considered borderline, scores at the 93rd-95th percentile are considered mild, scores at the 96th-98th percentile are considered moderate, and those at the 99th percentile are considered severe. Collateral or \"other\" rating scales are reported as number of symptoms observed in comparison to those reported by the client. Norms and percentile scores are not available for collateral reports.      Current Symptoms Scale- Self Report   Ms. Sanches completed the self-report inventory of current symptoms. Her Total ADHD Score was 48 which placed her in the 97th percentile for overall ADHD symptoms. She endorsed 5/9 Inattention symptoms, 5/9 Hyperactivity-Impulsivity symptoms, and 7/9 Sluggish Cognitive Tempo symptoms. She indicated that " symptoms have resulted in impaired functioning at school, home, and work.      Current Symptoms Scale- Other Report   Ms. Myron campbell sister completed the collateral inventory of current symptoms. The Total ADHD Score was 39 which placed her in the 93rd percentile for overall ADHD symptoms. Her sister endorsed 6/9 Inattention symptoms, 1/9 Hyperactivity-Impulsivity symptoms, and 7/9 Sluggish Cognitive Tempo symptoms. Ms. Myron campbell sister indicated that symptoms have impaired her functioning at school, home, and work.     Childhood Symptoms Scale- Self-Report  Ms. Sanches completed the self-report inventory of childhood symptoms. Her Total ADHD Score was 61 which placed her in the 99th percentile for overall ADHD symptoms in childhood. She endorsed 9/9 Inattention symptoms and 9/9 Hyperactivity-Impulsivity symptoms. Ms. Sanches indicated that symptoms emerged at the age of 9 years old and impaired her functioning at school and home as well as social relationships.        Childhood Scales- Other Report  Ms. Myron campbell sister completed the collateral inventory of childhood symptoms. The Total ADHD Score was 38 which placed her in the 85th percentile for overall ADHD symptoms. Her sister endorsed 7/9 Inattention symptoms and 0/9 Hyperactivity-Impulsivity symptoms. Ms. Myron campbell sister indicated that symptoms emerged when she was 10 years old and impaired her functioning at school and home.                          [place] Functional Impairment Scale: Self and Other Reports (BFIS-LF)  The BFIS is used to assess an individuals' psychosocial impairment in major life/daily activities that may be due to a mental health disorder. This assessment measure includes self and collateral rating scales. Self-report scores are reported as percentiles. Scores at the 76th-83rd percentile are considered marginal, scores at the 84th-92nd percentile are considered borderline, scores at the 93rd-95th percentile are considered mild, scores at the  "96th-98th percentile are considered moderate, and those at the 99th percentile are considered severe. Collateral or \"other\" rating scales are reported as number of symptoms observed in comparison to those reported by the client. Norms and percentile scores are not available for collateral reports.      Ms. Sanches earned a Mean Impairment Score of 5.6 (93rd percentile). She reported significant deficits in the areas of home-family, home-chores, work, participating in educational activities, money management, driving, self-care, and maintaining health. Her sister completed the collateral form; the mean score was not clinically significant. She outlined significant deficits in the areas of engaging in community activities, participating in educational activities, work, self-care and maintaining health.      [place] Deficits in Executive Functioning Scale (BDEFS)  The BDEFS is a measure used for evaluating dimensions of adult executive functioning in daily life. This assessment measure includes self and collateral rating scales. Self-report scores are reported as percentiles. Scores at the 76th-83rd percentile are considered marginal, scores at the 84th-92nd percentile are considered borderline, scores at the 93rd-95th percentile are considered mild, scores at the 96th-98th percentile are considered moderate, and those at the 99th percentile are considered severe. Collateral or \"other\" rating scales are reported as number of symptoms observed in comparison to those reported by the client. Norms and percentile scores are not available for collateral reports.     Ms. Sanches earned a Total Executive Functioning Score of 224 (97th percentile). The ADHD-Executive Functioning Index score was 26 (96th percentile). She reported significant deficits in the areas of time management, organization, problem solving, and motivation. Her sister completed the collateral form; the total score was not clinically significant. She outlined " significant deficits in the areas of time management and motivation.    [place] Multiphasic Personality Inventory-2nd Edition  First developed in the 1930's, the [place] Multiphasic Personality Inventory is a complex psychological instrument designed to measure personality characteristics of adults including mental illnesses, behaviors, thought patterns, strengths, and weaknesses. Several validity scales have been incorporated into the test in order to measure respondents  approach to the testing environment. In addition, ten standard clinical sub-scales are used to identify problem areas, problem intensity, and behaviors associated with identified characteristics. The MMPI-II, the most recent version of the instrument, was refined in the 1990's and adds additional strengths in terms of validity analysis and additional clinical sub-scales It should be noted the MMPI-II is regarded as one aspect only of a thorough diagnostic assessment and it was not normed with a standardized population including Native Americans.      Ms. Sanches was remotely administered the MMPI-2 on 07/06/2022 through the [facility]. The validity scales indicated the protocol was valid. The clinical, restructured, content, supplementary and PSY-5 scales fell at or below average.     CNS Vital Signs Neurocognitive Battery  The CNS Vital Signs Neurocognitive Battery is a remotely-administered assessment comprised of seven core subtests to individually measure the patient's verbal memory, visual memory, motor speed, psychomotor speed, reaction time, focus, ability to sustain attention and ability to adapt to changing rules and tasks.     Ms. Sanches was remotely administered the CNS Vital Signs on 07/28/2022 through the [facility]. She was instructed not to take her stimulant medication prior to the test and asserted she followed this  directive. The results are detailed below. Of note, above average domain scores indicate a standard score (SS) greater than 109 or a Percentile Rank (NH) greater than 74, indicating a high functioning test subject. Average is a SS  or NH 25-74, indicating normal function. Low Average is a SS 80-89 or NH 9-24 indicating a slight deficit or impairment. Below Average is a SS 70-79 or NH 2-8, indicating a moderate level of deficit or impairment. Very Low is a SS less than 70 or a NH less than 2, indicating a deficit and impairment. Validity Indicator denotes a guideline for representing the possibility of an invalid test or domain score, and can be influenced by patient understanding, effort, or other conditions. To confirm a diagnosis of ADHD, focus is given to the Complex Attention, Cognitive Flexibility, Processing Speed, Executive Function and Simple Attention scales which are most sensitive to the condition.       The Neurocognitive Index (NCI) measures an average score derived from the domain scores or a general assessment of the overall neurocognitive status of the patient. Ms. Sanches earned an NCI score of 94 (34th percentile) which placed her within the average range.     The Composite Memory scale measures how well subject can recognize, remember, and retrieve words and geometric figures, and is comprised of the Visual and Verbal Memory domains. Ms. Sanches earned a Composite Memory score of 111 (77th percentile) which placed her within the above average range.     The Verbal Memory scale measures how well subject can recognize, remember, and retrieve words. Ms. Sanches earned a Verbal Memory score of 89 (23rd percentile) which placed her within the low average range.    The Visual Memory scale measures how well subject can recognize, remember and retrieve geometric figures. Ms. Sanches earned a visual memory score of 124 (95th percentile) which placed her within the above average range.    The Psychomotor  Speed scale measures how well a subject perceives, attends, responds to complex visual-perceptual information and performs simple fine motor coordination, and is comprised of the Motor Speed and Processing Speed indexes. Ms. Sanches earned a Psychomotor Speed score of 103 (58th percentile) which placed her within the average range.     The Reaction Time scale measures how quickly the subject can react, in milliseconds, to a simple and increasingly complex direction set. Ms. Sanches s Reaction Time score was 92 (30th percentile) which placed her within the average range.     The Complex Attention scale measures the ability to track and respond to a variety of stimuli over lengthy periods of time and/or perform complex mental tasks requiring vigilance quickly and accurately. Ms. Sanches earned a Complex Attention score of 89 (23rd percentile) which placed her within the low average range.     The Cognitive Flexibility scale measures how well subject can adapt to rapidly changing and increasingly complex set of directions and/or to manipulate the information. Ms. Sanches s Cognitive Flexibility score was 73 (4th percentile) which placed her within the low range.     The Processing Speed scale measures how well a subject recognizes and processes information i.e., perceiving, attending/responding to incoming information, motor speed, fine motor coordination, and visual-perceptual ability. Ms. Sanches s Processing Speed score was 102 (55th percentile) which placed her within the average range.     The Executive Function scale measures how well a subject recognizes rules, categories, and manages or navigates rapid decision making. Ms. Sanches s Executive Function was 75 (5th percentile) which placed her within the low range.    The Simple Attention scale measures the ability to track and respond to a single defined stimulus over lengthy periods of time while performing vigilance and response inhibition quickly and accurately to  a simple task. Ms. Sanches s Simple Attention score was 96 (40th percentile) which placed her in the average range.     The Motor Speed scale measures the ability to perform simple movements to produce and satisfy an intention towards a manual action and goal. Ms. Myron campbell Motor Speed score was 100 (50th percentile) which placed her within the average range.     Domain Standard Score Percentile Description Validity   Neurocognitive Index 94 34 Average Yes   Composite Memory Measure 111 77 Above average Yes   Verbal Memory 89 23 Low average Yes   Visual Memory 124 95 Above average Yes   Psychomotor Speed 103 58 Average Yes   Reaction Time 92 30 Average Yes   Complex Attention 89 23 Low average Yes   Cognitive Flexibility 73 4 Low Yes   Processing Speed 102 55 Average Yes   Executive Function 75 5 Low Yes   Simple Attention 96 40 Average Yes   Motor Speed 100 50 Average Yes     Diagnostic Impressions:  Attention Deficit Hyperactivity Disorder, Combined Type   Ms. Sanches met criterion for ADHD. She recalled symptoms emerging at the age of 9 years old. She indicated she had difficulty organizing, cleaning her room, finishing chores, starting and completing homework, keeping track of her stuff, and independently initiating her work. Ms. Sanches s Childhood Symptoms forms from the Brisa Reports, as completed by her and her sister, were mixed. She outlined clinically significant impairment from ADHD symptoms in childhood, but her sister did not. In adulthood, Ms. Sanches indicated she has been struggling to focus and complete tasks at home and work. She acknowledged that at work she is often late, is easily bored and distracted, and poorly manages time. The results of the remaining Brisa Reports as completed by her and her sister also were mixed. She reported clinically significant impairment from ADHD symptoms in all areas of functioning while her sister outlined impairment in current functioning only. Ms. Sanches s level  of cognitive functioning was estimated to fall in the average to high average range yet not formally assessed. The results of the CNS Vital Signs showed clinically significant deficits in the areas of complex attention, cognitive flexibility, and executive functioning.     Recommendations:    1. Ms. Sanches likely would benefit from receiving supportive services, tutoring services, and accommodations through the post-secondary institution of their choice.    a. Ms. Sanches would benefit from taking exams alone, in a quiet room.  b. Ms. Sanches would benefit from extended exam time.  c. Ms. Sanches would benefit from note taking devices/services or audio recording of lectures.  d. Ms. Sanches would benefit from use of a fidget device or the ability move around the classroom as long as they are not disturbing others.  e. Ms. Sanches would benefit from extensions on course work due dates.  f. Ms. Sanches may benefit from student support for coping with a disability in college.  g. Ms. Sanches may benefit from help with time management and organizational skills.  h. Ms. Sanches may benefit from assistance with advocating to his instructors and departments, their needs, and accommodations.     2. Ms. Sanches would likely benefit from continuing to engage in medication management services. They were recommended to follow their provider s treatment guidelines and recommendations. If their diagnoses are beyond the scope of practice for their primary care provider, they should be recommended to a psychiatric provider.    3. Ms. Sanches may wish to reference the Coping with Attention-Deficit/Hyperactivity Disorder handout made available by [facility] for helpful tips for adults struggling with the disorder.    4. Ms. Sanches may wish to participate in cognitive testing to further assess her attention, working memory and processing speed.    5. Ms. Sanches likely would benefit from engaging in individual therapy.      Psychological Testing Bill/Service Summary:  Interview/Assessment Date Time   Interview 4/27/2022; 5/11/[phone] (.75); [id number] (.5) Previously billed   Documentation 4/27/2022; 5/11/[phone] (.25); [id number] (.5) Previously billed   Testing Evaluation 64803- 1st 60 mins 07926- each add 60   Record Review & Clarify Referral Question     Clinical Decision Making     Patient Symptom Management     Battery Modification     Integration/ Report Generation 7/27/2022 4723-0727 (1.5)   Feedback Session 7/29/2022 0850-1722 (.25)   Post-Service Work 7/29/2022 9150-0692 (.5)   Total Time 2.25    Total Units 1 1   Test Administration 38709- 1st 30 mins 27876- each add 30    Test Administration (Face)     Scoring     Total Time 0    Total Units 0    Diagnoses ADHD

## 2022-07-29 NOTE — PROGRESS NOTES
[facility]                                     Progress Note    Patient Name: Makeda Sanches  Date: 2022         Service Type: Individual      Session Start Time: 100  Session End Time:      Session Length: 17    Session #: 3    Attendees: Client    Service Modality:  Video Visit:      Provider verified identity through the following two step process.  Patient provided:  Patient     Telemedicine Visit: The patient's condition can be safely assessed and treated via synchronous audio and visual telemedicine encounter.      Reason for Telemedicine Visit: Services only offered telehealth    Originating Site (Patient Location): Patient's home    Distant Site (Provider Location): Provider Remote Setting- Home Office    Consent:  The patient/guardian has verbally consented to: the potential risks and benefits of telemedicine (video visit) versus in person care; bill my insurance or make self-payment for services provided; and responsibility for payment of non-covered services.     Patient would like the video invitation sent by:  My Chart    Mode of Communication:  Video Conference via [employer]    As the provider I attest to compliance with applicable laws and regulations related to telemedicine.    DATA  Interactive Complexity: No  Crisis: No        Progress Since Last Session (Related to Symptoms / Goals / Homework):   Symptoms: No change stable    Homework: None      Episode of Care Goals: Satisfactory progress - PREPARATION (Decided to change - considering how); Intervened by negotiating a change plan and determining options / strategies for behavior change, identifying triggers, exploring social supports, and working towards setting a date to begin behavior change     Current / Ongoing Stressors and Concerns:   Ms. Sanches asserted that she has had a busy summer, working, spending time with friends, and traveling. She reported no change in mood symptoms. She denied significant  "stressors. She indicated she has been taking Adderrall when working or doing school work and she noticed a difference. She highlighted is \"a lot more motivated\" and stays on task.      Treatment Objective(s) Addressed in This Session:   Review findings of ADHD Evaluation including testing results, diagnoses and recommendations.      Intervention:   During today's session, Ms. Sanches was provided with feedback regarding his ADHD Evaluation. This writer outlined the contents of the report Ms. Sanches informed him of the results of the symptom screenings. When administered the PHQ-9 and YARELY-7, Ms. Sanches s scores fell in the mild range. She denied current mood symptoms or stressors. This writer reviewed the results of the MMPI-2. The validity scales indicated the protocol was valid. The clinical, restructured, content, supplementary and PSY-5 scales fell at or below average. She did not meet criterion for a mood, anxiety or trauma disorder.     This writer also discussed the results of the Brisa Reports. Ms. Sanches s Childhood Symptoms forms from the Brisa Reports as completed by her and her sister were mixed. She outlined clinically significant impairment from ADHD but her sister did not. The results of the remaining Brisa Reports as completed by her and her sister also were mixed. She reported clinically significant impairment from ADHD symptoms in all areas of functioning while her sister outlined impairment in current functioning only. When administered the CNS Vital Signs, she demonstrated deficits in complex attention, cognitive flexibility, and executive functioning. She met criterion for ADHD. This writer reviewed recommendations and answered questions.    Assessments completed prior to visit:  CNS Vital Signs  Domain Standard Score Percentile Description Validity   Neurocognitive Index 94 34 Average Yes   Composite Memory Measure 111 77 Above average Yes   Verbal Memory 89 23 Low average Yes   Visual " Memory 124 95 Above average Yes   Psychomotor Speed 103 58 Average Yes   Reaction Time 92 30 Average Yes   Complex Attention 89 23 Low average Yes   Cognitive Flexibility 73 4 Low Yes   Processing Speed 102 55 Average Yes   Executive Function 75 5 Low Yes   Simple Attention 96 40 Average Yes   Motor Speed 100 50 Average Yes     PHQ9:   PHQ-9 SCORE 12/9/2020   PHQ-A Total Score 0     GAD7: No flowsheet data found.      ASSESSMENT: Current Emotional / Mental Status (status of significant symptoms):   Risk status (Self / Other harm or suicidal ideation)   Patient denies current fears or concerns for personal safety.   Patient denies current or recent suicidal ideation or behaviors.   Patient denies current or recent homicidal ideation or behaviors.   Patient denies current or recent self injurious behavior or ideation.   Patient denies other safety concerns.   Patient reports there has been no change in risk factors since their last session.     Patient reports there has been no change in protective factors since their last session.     Recommended that patient call 911 or go to the local ED should there be a change in any of these risk factors.     Appearance:   Appropriate    Eye Contact:   Fair    Psychomotor Behavior: Normal    Attitude:   Cooperative    Orientation:   All   Speech    Rate / Production: Normal     Volume:  Normal    Mood:    Normal Euthymic   Affect:    Appropriate    Thought Content:  Clear    Thought Form:  Coherent  Logical    Insight:    Good  and Fair      Medication Review:   No changes to current psychiatric medication(s)     Medication Compliance:   Yes     Changes in Health Issues:   None reported     Chemical Use Review:   Substance Use: Chemical use reviewed, no active concerns identified      Tobacco Use: No current tobacco use.      Diagnosis:  1. Attention deficit hyperactivity disorder (ADHD), combined type      Attention Deficit Hyperactivity Disorder, Combined Type   Ms. Sanches met  criterion for ADHD. She recalled symptoms emerging at the age of 9 years old. She indicated she had difficulty organizing, cleaning her room, finishing chores, starting and completing homework, keeping track of her stuff, and independently initiating her work. Ms. Sanches s Childhood Symptoms forms from the Brisa Reports, as completed by her and her sister, were mixed. She outlined clinically significant impairment from ADHD symptoms in childhood, but her sister did not. In adulthood, Ms. Sanches indicated she has been struggling to focus and complete tasks at home and work. She acknowledged that at work she is often late, is easily bored and distracted, and poorly manages time. The results of the remaining Brisa Reports as completed by her and her sister also were mixed. She reported clinically significant impairment from ADHD symptoms in all areas of functioning while her sister outlined impairment in current functioning only. Ms. Sanches s level of cognitive functioning was estimated to fall in the average to high average range yet not formally assessed. The results of the CNS Vital Signs showed clinically significant deficits in the areas of complex attention, cognitive flexibility, and executive functioning.     Collateral Reports Completed:   Routed note to Care Team Member(s)    PLAN: (Patient Tasks / Therapist Tasks / Other)  1. Ms. Sanches likely would benefit from receiving supportive services, tutoring services, and accommodations through the post-secondary institution of their choice.    a. Ms. Sanches would benefit from taking exams alone, in a quiet room.  b. Ms. Sanches would benefit from extended exam time.  c. Ms. Sanches would benefit from note taking devices/services or audio recording of lectures.  d. Ms. Sanches would benefit from use of a fidget device or the ability move around the classroom as long as they are not disturbing others.  e. Ms. Sanches would benefit from extensions on course work due  dates.  f. Ms. Sanches may benefit from student support for coping with a disability in college.  g. Ms. Sanches may benefit from help with time management and organizational skills.  h. Ms. Sanches may benefit from assistance with advocating to his instructors and departments, their needs, and accommodations.     2. Ms. Sanches would likely benefit from continuing to engage in medication management services. They were recommended to follow their provider s treatment guidelines and recommendations. If their diagnoses are beyond the scope of practice for their primary care provider, they should be recommended to a psychiatric provider.    3. Ms. Sanches may wish to reference the Coping with Attention-Deficit/Hyperactivity Disorder handout made available by [facility] for helpful tips for adults struggling with the disorder.    4. Ms. Sanches may wish to participate in cognitive testing to further assess her attention, working memory and processing speed.    5. Ms. Sanches likely would benefit from engaging in individual therapy.         Chari Camacho, PhD LP  July 29, 2022      Psychological Testing Bill/Service Summary:  Interview/Assessment Date Time   Interview 4/27/2022; 5/11/[id number] (.75); [id number] (.5) Previously billed   Documentation 4/27/2022; 5/11/2022 951-1005 (.25); 8409-9149 (.5) Previously billed   Testing Evaluation 50489- 1st 60 mins 93304- each add 60   Record Review & Clarify Referral Question     Clinical Decision Making     Patient Symptom Management     Battery Modification     Integration/ Report Generation 7/27/2022 7326-9095 (1.5)   Feedback Session 7/29/2022 9103-3732 (.25)   Post-Service Work 7/29/2022 3413-2604 (.5)   Total Time 2.25    Total Units 1 1   Test Administration 10334- 1st 30 mins 77059- each add 30    Test Administration (Face)     Scoring     Total Time 0    Total Units 0    Diagnoses ADHD

## 2022-11-21 ENCOUNTER — HEALTH MAINTENANCE LETTER (OUTPATIENT)
Age: 22
End: 2022-11-21

## 2023-02-13 ENCOUNTER — OFFICE VISIT (OUTPATIENT)
Dept: FAMILY MEDICINE | Facility: CLINIC | Age: 23
End: 2023-02-13
Payer: COMMERCIAL

## 2023-02-13 VITALS
DIASTOLIC BLOOD PRESSURE: 100 MMHG | OXYGEN SATURATION: 100 % | HEART RATE: 101 BPM | BODY MASS INDEX: 41.52 KG/M2 | RESPIRATION RATE: 16 BRPM | TEMPERATURE: 97.8 F | SYSTOLIC BLOOD PRESSURE: 149 MMHG | HEIGHT: 67 IN | WEIGHT: 264.5 LBS

## 2023-02-13 DIAGNOSIS — E66.813 CLASS 3 SEVERE OBESITY DUE TO EXCESS CALORIES WITH SERIOUS COMORBIDITY AND BODY MASS INDEX (BMI) OF 40.0 TO 44.9 IN ADULT (H): ICD-10-CM

## 2023-02-13 DIAGNOSIS — Z79.899 CONTROLLED SUBSTANCE AGREEMENT SIGNED: ICD-10-CM

## 2023-02-13 DIAGNOSIS — F90.0 ATTENTION DEFICIT HYPERACTIVITY DISORDER (ADHD), PREDOMINANTLY INATTENTIVE TYPE: ICD-10-CM

## 2023-02-13 DIAGNOSIS — E66.01 CLASS 3 SEVERE OBESITY DUE TO EXCESS CALORIES WITH SERIOUS COMORBIDITY AND BODY MASS INDEX (BMI) OF 40.0 TO 44.9 IN ADULT (H): ICD-10-CM

## 2023-02-13 DIAGNOSIS — F98.8 ATTENTION DEFICIT DISORDER, UNSPECIFIED HYPERACTIVITY PRESENCE: Primary | ICD-10-CM

## 2023-02-13 DIAGNOSIS — R03.0 ELEVATED BP WITHOUT DIAGNOSIS OF HYPERTENSION: ICD-10-CM

## 2023-02-13 PROCEDURE — 99214 OFFICE O/P EST MOD 30 MIN: CPT | Performed by: FAMILY MEDICINE

## 2023-02-13 RX ORDER — DEXTROAMPHETAMINE SACCHARATE, AMPHETAMINE ASPARTATE MONOHYDRATE, DEXTROAMPHETAMINE SULFATE AND AMPHETAMINE SULFATE 2.5; 2.5; 2.5; 2.5 MG/1; MG/1; MG/1; MG/1
10 CAPSULE, EXTENDED RELEASE ORAL DAILY
Qty: 90 CAPSULE | Refills: 0 | Status: SHIPPED | OUTPATIENT
Start: 2023-02-13 | End: 2023-07-15

## 2023-02-13 RX ORDER — DEXTROAMPHETAMINE SACCHARATE, AMPHETAMINE ASPARTATE MONOHYDRATE, DEXTROAMPHETAMINE SULFATE AND AMPHETAMINE SULFATE 7.5; 7.5; 7.5; 7.5 MG/1; MG/1; MG/1; MG/1
30 CAPSULE, EXTENDED RELEASE ORAL DAILY
Qty: 90 CAPSULE | Refills: 0 | Status: SHIPPED | OUTPATIENT
Start: 2023-02-13 | End: 2023-07-15

## 2023-02-13 NOTE — PROGRESS NOTES
Assessment & Plan       ICD-10-CM    1. Attention deficit disorder, unspecified hyperactivity presence  F98.8 amphetamine-dextroamphetamine (ADDERALL XR) 30 MG 24 hr capsule      2. Controlled substance agreement signed-CSA and urine tox done for Adderall XR 30 mg and 10 mg 7-11-22  Z79.899       3. Elevated BP without diagnosis of hypertension  R03.0       4. Class 3 severe obesity due to excess calories with serious comorbidity and body mass index (BMI) of 40.0 to 44.9 in adult (H)  E66.01 Comprehensive Weight Management    Z68.41       5. Attention deficit hyperactivity disorder (ADHD), predominantly inattentive type  F90.0 amphetamine-dextroamphetamine (ADDERALL XR) 10 MG 24 hr capsule        Set physical in 6 months.    We will review the Adderall at that time and also renew the controlled substance agreement and urine tox then.    Blood pressure elevated today at 149/100 on the left arm, 155/83 on the right arm.    We will watch this closely.  We will help set a blood pressure check here in 2 to 3 months.    Recommend low-salt diet.    I am so glad you are exercising 7 days a week that is great!    Start tracking calories and possibly micro macro new nutrients if you are able there is an nanette called my fitness pal.  Typically we have people try to keep calories to 3085-2397 a day.    I am placing referral to our comprehensive weight management program at the [facility].  The phone number will be in your after visit summary and please call them to set the appointment.  Not a bad idea to check with insurance at this clinic is covered.    I sent a 90 day refill of your Adderall prescriptions.  I asked the pharmacist to cancel out the previous 30-day prescription that was sent on February 9.  A good week or 2 before you need your next prescription please send a [employer] message and we will fill that.    Prescription monitoring program reviewed and patient following the controlled substance agreement.      30  "minutes spent on the date of the encounter doing chart review, history and exam, documentation and further activities per the note       BMI:   Estimated body mass index is 41.43 kg/m  as calculated from the following:    Height as of this encounter: 1.702 m (5' 7\").    Weight as of this encounter: 120 kg (264 lb 8 oz).   Weight management plan: Patient referred to endocrine and/or weight management specialty Discussed healthy diet and exercise guidelines        No follow-ups on file.    Anna Martinez MD  [facility]    Ryan Lombardi is a 22 year old, presenting for the following health issues:  A.D.H.D (ADHD follow up, discuss weight loss options)      A.D.H.D    History of Present Illness       Reason for visit:  Medication check    She eats 0-1 servings of fruits and vegetables daily.She consumes 1 sweetened beverage(s) daily.She exercises with enough effort to increase her heart rate 60 or more minutes per day.  She exercises with enough effort to increase her heart rate 4 days per week. She is missing 3 dose(s) of medications per week.  She is not taking prescribed medications regularly due to other.     ADD: She is not having any side effects from medication such as palpitations or not sleeping.  She does feel it is very helpful for her concentration with school.  She did have a psychologist visit within the last year or so.    Elevated BP: She said she does not have a diagnosis of hypertension and would like to monitor this for now.  She feels she is a little more salt than usual in the last day or 2.    Obesity:  Exercising 1 hour with [employer] 3-4 days a week.  Gets 54632 steps other days.  Not tracking calories, but trying to stay to 2 meals a day.  Trying not to eat as much fast food and making more food at home.  Working on lower carb diet.  With the [employer] bell does watch body fat measurments has lost a couple of inches around her abdomen.  Has lost a few " "pounds.  She would be agreeable to a referral to the comprehensive weight loss program.          Review of Systems         Objective    BP (!) 149/100 (BP Location: Left arm, Patient Position: Sitting, Cuff Size: Adult Large)   Pulse 101   Temp 97.8  F (36.6  C) (Oral)   Resp 16   Ht 1.702 m (5' 7\")   Wt 120 kg (264 lb 8 oz)   LMP 02/13/2023   SpO2 100%   BMI 41.43 kg/m    Body mass index is 41.43 kg/m .  Physical Exam   GENERAL: healthy, alert and no distress  RESP: lungs clear to auscultation - no rales, rhonchi or wheezes  CV: regular rate and rhythm, normal S1 S2, no S3 or S4, no murmur, click or rub, no peripheral edema and peripheral pulses strong                    "

## 2023-02-13 NOTE — PATIENT INSTRUCTIONS
Set physical in 6 months.    We will review the Adderall at that time and also renew the controlled substance agreement and urine tox then.    Blood pressure elevated today at 149/100 on the left arm, 155/83 on the right arm.    We will watch this closely.  We will help set a blood pressure check here in 2 to 3 months.    Recommend low-salt diet.    I am so glad you are exercising 7 days a week that is great!    Start tracking calories and possibly micro macro new nutrients if you are able there is an nanette called my fitness pal.  Typically we have people try to keep calories to 1131-4825 a day.    I am placing referral to our comprehensive weight management program at the [facility].  The phone number will be in your after visit summary and please call them to set the appointment.  Not a bad idea to check with insurance at this clinic is covered.    I sent a 90 day refill of your Adderall prescriptions.  I asked the pharmacist to cancel out the previous 30-day prescription that was sent on February 9.  A good week or 2 before you need your next prescription please send a [employer] message and we will fill that.    Prescription monitoring program reviewed and patient following the controlled substance agreement.

## 2023-05-30 ENCOUNTER — OFFICE VISIT (OUTPATIENT)
Dept: SURGERY | Facility: CLINIC | Age: 23
End: 2023-05-30
Attending: FAMILY MEDICINE
Payer: COMMERCIAL

## 2023-05-30 VITALS
DIASTOLIC BLOOD PRESSURE: 86 MMHG | BODY MASS INDEX: 41.75 KG/M2 | HEIGHT: 67 IN | WEIGHT: 266 LBS | SYSTOLIC BLOOD PRESSURE: 138 MMHG

## 2023-05-30 DIAGNOSIS — E66.01 CLASS 3 SEVERE OBESITY DUE TO EXCESS CALORIES WITH SERIOUS COMORBIDITY AND BODY MASS INDEX (BMI) OF 40.0 TO 44.9 IN ADULT (H): Primary | ICD-10-CM

## 2023-05-30 DIAGNOSIS — L30.9 ECZEMA, UNSPECIFIED TYPE: ICD-10-CM

## 2023-05-30 DIAGNOSIS — E66.813 CLASS 3 SEVERE OBESITY DUE TO EXCESS CALORIES WITH SERIOUS COMORBIDITY AND BODY MASS INDEX (BMI) OF 40.0 TO 44.9 IN ADULT (H): Primary | ICD-10-CM

## 2023-05-30 DIAGNOSIS — R53.83 OTHER FATIGUE: ICD-10-CM

## 2023-05-30 DIAGNOSIS — L68.0 HIRSUTISM: ICD-10-CM

## 2023-05-30 DIAGNOSIS — E28.2 PCOS (POLYCYSTIC OVARIAN SYNDROME): ICD-10-CM

## 2023-05-30 DIAGNOSIS — J30.2 SEASONAL ALLERGIC RHINITIS, UNSPECIFIED TRIGGER: ICD-10-CM

## 2023-05-30 PROBLEM — E66.9 OBESITY: Status: ACTIVE | Noted: 2023-05-30

## 2023-05-30 PROCEDURE — 99215 OFFICE O/P EST HI 40 MIN: CPT | Performed by: FAMILY MEDICINE

## 2023-05-30 RX ORDER — CYANOCOBALAMIN (VITAMIN B-12) 2500 MCG
2500 TABLET, SUBLINGUAL SUBLINGUAL DAILY
Qty: 90 TABLET | Refills: 3 | Status: SHIPPED | OUTPATIENT
Start: 2023-05-30

## 2023-05-30 RX ORDER — SEMAGLUTIDE 1 MG/.5ML
1 INJECTION, SOLUTION SUBCUTANEOUS WEEKLY
Qty: 2 ML | Refills: 0 | Status: SHIPPED | OUTPATIENT
Start: 2023-07-30 | End: 2023-08-25

## 2023-05-30 RX ORDER — SEMAGLUTIDE 0.25 MG/.5ML
0.25 INJECTION, SOLUTION SUBCUTANEOUS WEEKLY
Qty: 2 ML | Refills: 0 | Status: SHIPPED | OUTPATIENT
Start: 2023-05-30 | End: 2023-06-27

## 2023-05-30 RX ORDER — METFORMIN HCL 500 MG
500 TABLET, EXTENDED RELEASE 24 HR ORAL 2 TIMES DAILY WITH MEALS
Qty: 180 TABLET | Refills: 3 | Status: SHIPPED | OUTPATIENT
Start: 2023-05-30 | End: 2024-05-13

## 2023-05-30 RX ORDER — SEMAGLUTIDE 0.5 MG/.5ML
0.5 INJECTION, SOLUTION SUBCUTANEOUS WEEKLY
Qty: 2 ML | Refills: 0 | Status: SHIPPED | OUTPATIENT
Start: 2023-06-30 | End: 2023-06-06

## 2023-05-30 ASSESSMENT — SLEEP AND FATIGUE QUESTIONNAIRES
HOW LIKELY ARE YOU TO NOD OFF OR FALL ASLEEP WHILE SITTING AND READING: MODERATE CHANCE OF DOZING
HOW LIKELY ARE YOU TO NOD OFF OR FALL ASLEEP WHILE SITTING AND TALKING TO SOMEONE: WOULD NEVER DOZE
HOW LIKELY ARE YOU TO NOD OFF OR FALL ASLEEP WHILE SITTING INACTIVE IN A PUBLIC PLACE: WOULD NEVER DOZE
HOW LIKELY ARE YOU TO NOD OFF OR FALL ASLEEP WHEN YOU ARE A PASSENGER IN A CAR FOR AN HOUR WITHOUT A BREAK: SLIGHT CHANCE OF DOZING
HOW LIKELY ARE YOU TO NOD OFF OR FALL ASLEEP WHILE WATCHING TV: SLIGHT CHANCE OF DOZING
HOW LIKELY ARE YOU TO NOD OFF OR FALL ASLEEP WHILE LYING DOWN TO REST IN THE AFTERNOON WHEN CIRCUMSTANCES PERMIT: MODERATE CHANCE OF DOZING
HOW LIKELY ARE YOU TO NOD OFF OR FALL ASLEEP IN A CAR, WHILE STOPPED FOR A FEW MINUTES IN TRAFFIC: WOULD NEVER DOZE
HOW LIKELY ARE YOU TO NOD OFF OR FALL ASLEEP WHILE SITTING QUIETLY AFTER LUNCH WITHOUT ALCOHOL: MODERATE CHANCE OF DOZING

## 2023-05-30 NOTE — PATIENT INSTRUCTIONS
[facility]    Remember to:    -Eat 3 meals a day (not 2, not 5) Chew your food well/SLOW down  -Eat your protein first  -Be a water drinker/Minize liquid calories (no regular pop, no juice) skim or 1% milk OK  -Sleep 7-8 hours each night. Address sleep if problematic  -Stress management is important. Address if problematic  -Move-8000 steps daily Muscle: maintain your muscle mass (strength training 2X/wk)  -Wheat, not white (bread, pasta, crackers, suresh, bagels, tortillas, rice)  -Limit restaurant, cafeteria, take out, drive through to 2 times per week or less  -Minimize caffeine, alcohol, and night-time snacking  -Consider keeping a food diary (i.e. My Fitness Pal, Lose It, or other food tracker)  -Follow up with the dietitian      **Some lean proteins: chicken, turkey, tuna, salmon, crab, fish, shrimp, scallops, lobster, lean cuts of beef and pork, luncheon meats, veggie burgers, beans (black, lima, garbanzo, cole, kidney, refried), chile, cottage cheese, string cheese, other cheese, eggs, tofu, peanut butter, nuts, vegan crumbles, greek yogurt       MEDICATIONS FOR WEIGHT LOSS    PHENTERMINE (Adipex): approved in 1959 for appetite suppression.  It has stimulant effects and cannot be used with Ritalin, Concerta, or other stimulants.  It is not addictive although it's chemically related to amphetamines.  Amphetamines are addictive. The most common side effects are dry mouth, increased energy and concentration, increased pulse, and constipation.  You should not take phentermine if you have glaucoma, hyperthyroidism, or uncontrolled/untreated hypertension.  $24-$30 for 90 tablets    PHENDIMETRAZINE (Bontril): Appetite suppressant/sympathomimetic.  Controlled substance.  Side effects and contraindications similar to phentermine.  $45-$60 for 3 month supply    TOPIRAMATE (Topamax): Anti-seizure medication, also used to prevent migraines.  Side effects include paresthesia, glaucoma, altered  concentration, attention difficulties, memory and speech problems, metabolic acidosis, depression, increase in body temperature and decrease sweating, kidney stones, and weight loss.  Do not take Topamax while taking Depakote as this can cause high ammonia levels.  You must have reliable birth control as Topamax can cause birth defects.  Discontinue slowly to avoid seizure.  Insurance usually covers Topiramate.    QSYMIA (Phentermine + Topamax):  See above information about phentermine and Topamax.  Most common side effects are paresthesia, dizziness, distortion of taste, insomnia, constipation, and dry mouth.  $150-$220 per month    DIETHYLPROPION (Tenuate): Sympathomimetic amine.  Appetite suppressant.  Doses 25 mg before meals or 75 mg per day.  Most common side effects are hypertension, palpitations, EKG changes, and increased seizures in epileptics.  There can be a possible adverse reaction with alcohol.  $70-$90 per 3 months    XENICAL(Orlistat) (Graham OTC): Approved in 1999.  A fat-blocker.  It reduces absorption of fat by approximately 30%.  It has beneficial effects on lipid levels.  Side effects include diarrhea, abdominal cramping, fecal incontinence, oily spotting, and flatus with discharge.  Side effects are minimized if the patient limits their dietary fat to no more than 30% of their diet.  Patients must take a multivitamin daily to avoid vitamin D, E, A, and K deficiency.  $120 per month    CONTRAVE (Naltrexone/Bupropion): Approved in 2014.  It is a combination pill including an opioid receptor blocker and a long-standing antidepressant.  Most common side effects include nausea, constipation, headache, vomiting, dizziness, trouble sleeping, dry mouth, and diarrhea.  With all antidepressants watch for mood changes and suicide ideation.  Bupropion has been known to lower the seizure threshold in those prone to seizures.  It should not be used in a patient with a recent history of bulimia. It has been  associated with liver damage from taking higher than recommended doses.  Do not use countrave if you have taken opioid medications or opioid street drugs in the past 7-10 days, if you are currently on opioids, methadone, or if you are pregnant.  Do not use contrave if you have recently stopped using alcohol or benzodiazepines.  Taper off contrave slowly.  Dosing: titrate up to 2 tablets twice daily of the Naltrexone 8 mg/ Bupropion 90 mg tablets.  $200 for 90 tablets    SAXENDA (Liraglutide): A daily injectable (3mg daily) medication used for type 2 diabetes (Victoza). Glucagon-like peptide-1 (GLP-1) agonist. Contraindications include personal or family history of medullary thyroid cancer or MEN type 2. Acute pancreatitis has been observed in patients taking liraglutide. Liraglutide causes C-cell tumors in rats and mice. It is unknown whether liraglutide causes tumors in humans. Start at 0.6mg, increasing the dose weekly up to 3mg.     TRULICITY (Dulaglutide): A weekly injectable (4.5mg weekly) medication used for type 2 diabetes. Glucagon-like peptide-1(GLP-1) agonist. Contraindications include personal or family history of medullary thyroid cancer or MEN type 2. Acute pancreatitis has been observed in patients taking liraglutide. Dulaglutide causes C-cell tumors in rats and mice. It is unknown whether liraglutide causes tumors in humans. Start at 0.75 mg, 1.5 mg, 3.0 mg, to 4.5 mg increasing the dose monthly.      VYVANSE (Lisdexamfetamine dimesylate): a CNS stimulant used to treat ADHD. Indicated for the treatment of moderate to severe Binge Eating Disorder in Adults. Contraindicated in patients with known heart disease, structural abnormalities of the heart, serious heart arrhythmias or unexplained syncope. CNS stimulants such as vyvanse may cause manic or psychotic symptoms in patients with BPAD or pre-existing psychosis. Use with caution in patients with Raynaud's phenomenon. Most common side effects include  dry mouth, insomnia, decreased appetite, increased heart rate, jittery feeling, constipation and anxiety.     WEGOVY (Semaglutide): A weekly injectable (2.4mg weekly) medication used for type 2 diabetes (Ozempic). Glucagon-like peptide-1 (GLP-1) agonist. Contraindications include personal or family history of medullary thyroid cancer or MEN type 2. Acute pancreatitis has been observed in patients taking Semaglutide. Semaglutide causes C-cell tumors in rats and mice. It is unknown whether Semaglutide causes tumors in humans. Start at 0.25mg, increasing to 0.5, 1.0, 1.7 then 2.4 monthly.     MOUNJARO (Tirzepatide): A weekly injectable medication indicated for type 2 diabetes. Glucagon-like-peptide-1 (GLP-1) agonist and Glucose-Dependent Insulinotropic Polypeptide (GIP) agonist. Contraindications include personal or family history of medullary thyroid cancer or MEN type 2. Acute pancreatitis has been observed in patients taking Tirzepatide. Tirzepatide causes C-cell tumors in rats and mice. It is unknown whether Tirzepatide causes tumors in humans. Watch for neck mass, difficulty swallowing, persistent hoarseness, and epigastric abdominal pain. Most common side effects include nausea, diarrhea, vomiting, constipation, dyspepsia, and abdominal pain. Start at 2.5mg, increasing to 5.0, 7.5, 10, 12.5 then 15mg monthly.

## 2023-05-30 NOTE — PROGRESS NOTES
"    New Medical Weight Management Consult    PATIENT:  Makeda Sanches  MRN:         [id number]  :         2000  PATTIE:         2023    Dear Dr. Martinez,    I had the pleasure of seeing your patient, Makeda Sanches. Full intake/assessment was done to determine barriers to weight loss success and develop a treatment plan. Makeda Sanches is a 22 year old female interested in treatment of medical problems associated with excess weight. She has a height of 5' 7\", a weight of 266 lbs 0 oz, and the calculated Body mass index is 41.66 kg/m .    ASSESSMENT/PLAN:  Insulin resistance/hirsutism by history and physical  Declines labs d/t needle phobia.   Start metformin and add B-12 as metformin can make B-12 go low  Wegovy (appears to be a covered benefit)  Dietitian  Discussed insulin resistance and importance of protecting sleep, stress management, daily activity, and eating protein with each meal to keep lean muscle mass, lose body fat thus optimizing metabolism and improving insulin sensitivity    She has the following co-morbidities:        2023     6:52 PM   --   I have the following health issues associated with obesity High Blood Pressure   I have the following symptoms associated with obesity Back Pain    Fatigue            View : No data to display.                    2023     6:52 PM   Referring Provider   Please name the provider who referred you to Medical Weight Management  If you do not know, please answer \"I Don't Know\" [email or web address]           2023     6:52 PM   Weight History   How concerned are you about your weight? Very Concerned   I became overweight In College   The following factors have contributed to my weight gain Change in Schedule    Eating Wrong Types of Food    Eating Too Much    Lack of Exercise    Genetic (Runs in the Family)    Stress   I have tried the following methods to lose weight Watching Portions or Calories    Exercise   My lowest weight since age 20 " was 200   My highest weight since age 20 was 260   The most weight I have ever lost was (lbs) 30   I have the following family history of obesity/being overweight My mother is overweight    My father is overweight   How has your weight changed over the last year? Gained   How many pounds? 30           5/21/2023     6:52 PM   Diet Recall Review with Patient   If you do eat lunch, what types of food do you typically eat? usually some type of fast food or a sandwich i make at home   If you do eat supper, what types of food do you typically eat? 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 0   How many of glasses of milk do you drink in a typical day? 1   If you do drink milk, what type? Skim   How many 8oz glasses of sugar containing drinks such as Parag-Aid/sweet tea do you drink in a day? 0   How many cans/bottles of sugar pop/soda/tea/sports drinks do you drink in a day? 1   How many cans/bottles of diet pop/soda/tea or sports drink do you drink in a day? 0   How often do you have a drink of alcohol? Monthly or Less   If you do drink, how many drinks might you have in a day? 1 or 2           5/21/2023     6:52 PM   Eating Habits   Generally, my meals include foods like these bread, pasta, rice, potatoes, corn, crackers, sweet dessert, pop, or juice A Few Times a Week   Generally, my meals include foods like these fried meats, brats, burgers, french fries, pizza, cheese, chips, or ice cream Almost Everyday   Eat fast food (like McDonalds, Burger Lukasz, Taco Bell) A Few Times a Week   Eat at a buffet or sit-down restaurant Once a Week   Eat most of my meals in front of the TV or computer Almost Everyday   Often skip meals, eat at random times, have no regular eating times Almost Everyday   Rarely sit down for a meal but snack or graze throughout Less Than  Weekly   Eat extra snacks between meals Less Than Weekly   Eat most of my food at the end of the day A Few Times a Week   Eat in the middle of the night or wake up at night to eat Less Than Weekly   Eat extra snacks to prevent or correct low blood sugar Never   Eat to prevent acid reflux or stomach pain Less Than Weekly   Worry about not having enough food to eat Never   I eat when I am depressed Never   I eat when I am stressed Less Than Weekly   I eat when I am bored Almost Everyday   I eat when I am anxious Never   I eat when I am happy or as a reward A Few Times a Week   I feel hungry all the time even if I just have eaten Once a Week   Feeling full is important to me Almost Everyday   I finish all the food on my plate even if I am already full Almost Everyday   I can't resist eating delicious food or walk past the good food/smell Almost Everyday   I eat/snack without noticing that I am eating Less Than Weekly   I eat when I am preparing the meal A Few Times a Week   I eat more than usual when I see others eating Never   I have trouble not eating sweets, ice cream, cookies, or chips if they are around the house Almost Everyday   I think about food all day Never   What foods, if any, do you crave? Chips/Crackers   Please list any other foods you crave? fried food           5/21/2023     6:52 PM   Amount of Food   I feel out of control when eating Monthly   I eat a large amount of food, like a loaf of bread, a box of cookies, a pint/quart of ice cream, all at once Monthly   I eat a large amount of food even when I am not hungry Monthly   I eat rapidly Weekly   I eat alone because I feel embarrassed and do not want others to see how much I have eaten Never   I eat until I am uncomfortably full Almost Everyday   I feel bad, disgusted, or guilty after I overeat Almost Everyday           5/21/2023     6:52 PM   Activity/Exercise History   How much of a typical 12 hour day do you spend sitting? Half the Day   How much  of a typical 12 hour day do you spend lying down? Less Than Half the Day   How much of a typical day do you spend walking/standing? Half the Day   How many hours (not including work) do you spend on the TV/Video Games/Computer/Tablet/Phone? 4-5 Hours   How many times a week are you active for the purpose of exercise? Once a Week   What keeps you from being more active? Too tired    Unsure What To Do    Worried People Will Look At Me   How many total minutes do you spend doing some activity for the purpose of exercising when you exercise? 15-30 Minutes       PAST MEDICAL HISTORY:  Past Medical History:   Diagnosis Date     Eczema      Elevated blood pressure reading without diagnosis of hypertension      Fatigue      Hirsutism      Obesity      Seasonal allergic rhinitis            5/21/2023     6:52 PM   Work/Social History Reviewed With Patient   My employment status is Part-Time    Student   My job is  and in college   How much of your job is spent on the computer or phone? Less Than 50%   How many hours do you spend commuting to work daily? 1 there and back   What is your marital status? Single   Who do you live with? my parents and siblings   Who does the food shopping? my mom and dad           5/21/2023     6:52 PM   Mental Health History Reviewed With Patient   Have you ever been physically or sexually abused? No   How often in the past 2 weeks have you felt little interest or pleasure in doing things? Not at all   Over the past 2 weeks how often have you felt down, depressed, or hopeless? Not at all           5/21/2023     6:52 PM   Sleep History Reviewed With Patient   How many hours do you sleep at night? 8       MEDICATIONS:   Current Outpatient Medications   Medication Sig Dispense Refill     amphetamine-dextroamphetamine (ADDERALL XR) 30 MG 24 hr capsule Take 1 capsule (30 mg) by mouth daily 90 capsule 0     metFORMIN (GLUCOPHAGE XR) 500 MG 24 hr tablet Take 1 tablet (500 mg) by mouth 2 times  "daily (with meals) 180 tablet 3     Semaglutide-Weight Management (WEGOVY) 0.25 MG/0.5ML pen Inject 0.25 mg Subcutaneous once a week for 28 days 2 mL 0     [START ON 6/30/2023] Semaglutide-Weight Management (WEGOVY) 0.5 MG/0.5ML pen Inject 0.5 mg Subcutaneous once a week for 28 days 2 mL 0     [START ON 7/30/2023] Semaglutide-Weight Management (WEGOVY) 1 MG/0.5ML pen Inject 1 mg Subcutaneous once a week for 28 days 2 mL 0     vitamin B-12 (CYANOCOBALAMIN) 2500 MCG sublingual tablet Take 1 tablet (2,500 mcg) by mouth daily 90 tablet 3     amphetamine-dextroamphetamine (ADDERALL XR) 10 MG 24 hr capsule Take 1 capsule (10 mg) by mouth daily (Patient not taking: Reported on 5/30/2023) 90 capsule 0       ALLERGIES:   No Known Allergies    PHYSICAL EXAM:  /86 (BP Location: Right arm, Patient Position: Sitting)   Ht 1.702 m (5' 7\")   Wt 120.7 kg (266 lb)   BMI 41.66 kg/m    Pleasant and in no distress  A little emotional  Neck 17\"  Mallampati 3+  Heart regular without murmur  Lungs clear  Abdomen 52\"  Extremities: arms shaved. Good distal pulses,   Alert and oriented    FOLLOW-UP:   As above    TIME: 60 min spent on evaluation, management, counseling, education, & motivational interviewing     Sincerely,    Frida Mathew MD      "

## 2023-06-06 ENCOUNTER — MYC MEDICAL ADVICE (OUTPATIENT)
Dept: SURGERY | Facility: CLINIC | Age: 23
End: 2023-06-06

## 2023-06-06 ENCOUNTER — TELEPHONE (OUTPATIENT)
Dept: SURGERY | Facility: CLINIC | Age: 23
End: 2023-06-06

## 2023-06-06 DIAGNOSIS — E66.01 CLASS 3 SEVERE OBESITY DUE TO EXCESS CALORIES WITH SERIOUS COMORBIDITY AND BODY MASS INDEX (BMI) OF 40.0 TO 44.9 IN ADULT (H): ICD-10-CM

## 2023-06-06 DIAGNOSIS — E66.813 CLASS 3 SEVERE OBESITY DUE TO EXCESS CALORIES WITH SERIOUS COMORBIDITY AND BODY MASS INDEX (BMI) OF 40.0 TO 44.9 IN ADULT (H): ICD-10-CM

## 2023-06-06 RX ORDER — SEMAGLUTIDE 0.5 MG/.5ML
0.5 INJECTION, SOLUTION SUBCUTANEOUS WEEKLY
Qty: 2 ML | Refills: 0 | Status: SHIPPED | OUTPATIENT
Start: 2023-06-30 | End: 2023-07-24

## 2023-06-06 NOTE — TELEPHONE ENCOUNTER
Sent patient a text message video link to connect. Called patient when she was not connected for the video nutrition visit. Left voicemail instructing patient to connect or to call the clinic (phone number provided) to reschedule appointment.    Donna Wallace RD, LD

## 2023-07-15 ENCOUNTER — MYC REFILL (OUTPATIENT)
Dept: FAMILY MEDICINE | Facility: CLINIC | Age: 23
End: 2023-07-15
Payer: COMMERCIAL

## 2023-07-15 DIAGNOSIS — F90.0 ATTENTION DEFICIT HYPERACTIVITY DISORDER (ADHD), PREDOMINANTLY INATTENTIVE TYPE: ICD-10-CM

## 2023-07-15 DIAGNOSIS — F98.8 ATTENTION DEFICIT DISORDER, UNSPECIFIED HYPERACTIVITY PRESENCE: ICD-10-CM

## 2023-07-16 RX ORDER — DEXTROAMPHETAMINE SACCHARATE, AMPHETAMINE ASPARTATE MONOHYDRATE, DEXTROAMPHETAMINE SULFATE AND AMPHETAMINE SULFATE 2.5; 2.5; 2.5; 2.5 MG/1; MG/1; MG/1; MG/1
10 CAPSULE, EXTENDED RELEASE ORAL DAILY
Qty: 90 CAPSULE | Refills: 0 | Status: SHIPPED | OUTPATIENT
Start: 2023-07-16 | End: 2024-01-02

## 2023-07-16 RX ORDER — DEXTROAMPHETAMINE SACCHARATE, AMPHETAMINE ASPARTATE MONOHYDRATE, DEXTROAMPHETAMINE SULFATE AND AMPHETAMINE SULFATE 7.5; 7.5; 7.5; 7.5 MG/1; MG/1; MG/1; MG/1
30 CAPSULE, EXTENDED RELEASE ORAL DAILY
Qty: 90 CAPSULE | Refills: 0 | Status: SHIPPED | OUTPATIENT
Start: 2023-07-16 | End: 2024-01-02

## 2023-08-24 ENCOUNTER — MYC MEDICAL ADVICE (OUTPATIENT)
Dept: SURGERY | Facility: CLINIC | Age: 23
End: 2023-08-24
Payer: COMMERCIAL

## 2023-08-24 DIAGNOSIS — E66.01 CLASS 3 SEVERE OBESITY DUE TO EXCESS CALORIES WITH SERIOUS COMORBIDITY AND BODY MASS INDEX (BMI) OF 40.0 TO 44.9 IN ADULT (H): ICD-10-CM

## 2023-08-24 DIAGNOSIS — E66.813 CLASS 3 SEVERE OBESITY DUE TO EXCESS CALORIES WITH SERIOUS COMORBIDITY AND BODY MASS INDEX (BMI) OF 40.0 TO 44.9 IN ADULT (H): ICD-10-CM

## 2023-08-25 RX ORDER — SEMAGLUTIDE 1 MG/.5ML
1 INJECTION, SOLUTION SUBCUTANEOUS WEEKLY
Qty: 2 ML | Refills: 0 | Status: SHIPPED | OUTPATIENT
Start: 2023-08-25 | End: 2024-01-02

## 2023-08-30 ENCOUNTER — VIRTUAL VISIT (OUTPATIENT)
Dept: SURGERY | Facility: CLINIC | Age: 23
End: 2023-08-30
Payer: COMMERCIAL

## 2023-08-30 VITALS — WEIGHT: 260 LBS | HEIGHT: 67 IN | BODY MASS INDEX: 40.81 KG/M2

## 2023-08-30 DIAGNOSIS — E66.01 CLASS 3 SEVERE OBESITY DUE TO EXCESS CALORIES WITH SERIOUS COMORBIDITY AND BODY MASS INDEX (BMI) OF 40.0 TO 44.9 IN ADULT (H): Primary | ICD-10-CM

## 2023-08-30 DIAGNOSIS — E66.813 CLASS 3 SEVERE OBESITY DUE TO EXCESS CALORIES WITH SERIOUS COMORBIDITY AND BODY MASS INDEX (BMI) OF 40.0 TO 44.9 IN ADULT (H): Primary | ICD-10-CM

## 2023-08-30 PROCEDURE — 99214 OFFICE O/P EST MOD 30 MIN: CPT | Mod: VID | Performed by: FAMILY MEDICINE

## 2023-08-30 RX ORDER — SEMAGLUTIDE 2.4 MG/.75ML
2.4 INJECTION, SOLUTION SUBCUTANEOUS WEEKLY
Qty: 9 ML | Refills: 3 | Status: SHIPPED | OUTPATIENT
Start: 2023-10-16 | End: 2023-10-23

## 2023-08-30 RX ORDER — SEMAGLUTIDE 1.7 MG/.75ML
1.7 INJECTION, SOLUTION SUBCUTANEOUS WEEKLY
Qty: 3 ML | Refills: 0 | Status: SHIPPED | OUTPATIENT
Start: 2023-09-18 | End: 2023-10-16

## 2023-08-30 NOTE — PATIENT INSTRUCTIONS
For Prevention and Treatment of Constipation    From least aggressive to most aggressive:    Move: wallking-the more we move, the more our bowels move  Water: Drink water-64oz+ day  Go when you need to go. Don't wait. The longer you wait, the harder it gets.  Fiber: Fruit, raw veggies, nuts, whole grains,   Stool Softeners: if constipation is mild and for maintenance  Gentle laxatives: Miralax, senokot, dulcolax , Smooth move tea as needed    More aggressive (and typically won't get to this point)  Milk of Magnesia  Mag Citrate (what you drink before a colonoscopy)  Suppositories  Enema

## 2023-08-30 NOTE — LETTER
8/30/2023         RE: Makeda Sanches  [address] [facility] [address]        Dear Colleague,    Thank you for referring your patient, Makeda Sanches, to the [facility] [place]. Please see a copy of my visit note below.    Makeda Sanches is 23 year old  female who presents for a billable video visit today.    How would you like to obtain your AVS? MyChart  If dropped from the video visit, the video invitation should be resent by: Send to e-mail at: [email or web address]  Will anyone else be joining your video visit? No      Video Start Time:  11:30    Are there any specific questions or needs that you would like addressed at your visit today?  Pt states no questions or concerns. Follow up made    Bariatric Follow-up    23 year old  female BMI:Body mass index is 40.72 kg/m .    Weight:   Wt Readings from Last 1 Encounters:   08/30/23 117.9 kg (260 lb)    pounds    Comorbidities:  Patient Active Problem List   Diagnosis     Keratosis Pilaris     Murmurs     ADD (attention deficit disorder)     Vitamin D deficiency     Family history of hypothyroidism     Controlled substance agreement signed-CSA and urine tox done for Adderall XR 30 mg and 10 mg 7-11-22     Seasonal allergic rhinitis     Eczema     Hirsutism     Obesity     Fatigue         Interim: No soda, lower sugars. More active. Worked a lot this summer. . 3-12 on her feet all day. Taking the metformin. Wegovy causes constipation. Using prune juice which works. Drinking a lot of water and milk Skim. Started 1.0mg on Monday.    Plan:  DIET  Continue hydration. Congratulated on    EXERCISE Explored winter and long term exercise plan and plan when moves to [place]   PHARMACOTHERAPY increase Wegovy in 3 weeks to 1.7 then 2.mg    stay ahead of constipation.     -We reviewed her medications and whether associated with weight gain.    Current Outpatient Medications:       amphetamine-dextroamphetamine (ADDERALL XR) 10 MG 24 hr capsule, Take 1 capsule (10 mg) by mouth daily, Disp: 90 capsule, Rfl: 0     amphetamine-dextroamphetamine (ADDERALL XR) 30 MG 24 hr capsule, Take 1 capsule (30 mg) by mouth daily, Disp: 90 capsule, Rfl: 0     metFORMIN (GLUCOPHAGE XR) 500 MG 24 hr tablet, Take 1 tablet (500 mg) by mouth 2 times daily (with meals), Disp: 180 tablet, Rfl: 3     Semaglutide-Weight Management (WEGOVY) 1 MG/0.5ML pen, Inject 1 mg Subcutaneous once a week, Disp: 2 mL, Rfl: 0     [START ON 9/18/2023] Semaglutide-Weight Management (WEGOVY) 1.7 MG/0.75ML pen, Inject 1.7 mg Subcutaneous once a week for 28 days, Disp: 3 mL, Rfl: 0     [START ON 10/16/2023] Semaglutide-Weight Management (WEGOVY) 2.4 MG/0.75ML pen, Inject 2.4 mg Subcutaneous once a week, Disp: 9 mL, Rfl: 3     vitamin B-12 (CYANOCOBALAMIN) 2500 MCG sublingual tablet, Take 1 tablet (2,500 mcg) by mouth daily, Disp: 90 tablet, Rfl: 3      We discussed [facility] Bariatric Basics including:  -eating 3 meals daily  -eating protein first  -eating slowly, chewing food well  -avoiding/limiting calorie containing beverages  -choosing wheat, not white with breads, crackers, pastas, suresh, bagels, tortillas, rice  -limiting restaurant or cafeteria eating to twice a week or less  -We discussed the importance of restorative sleep and stress management in maintaining a healthy weight.  -We discussed insulin resistance and glycemic index as it relates to appetite and weight control  -We discussed the National Weight Control Registry healthy weight maintenance strategies and ways to optimize metabolism.  -We discussed the importance of physical activity including cardiovascular and strength training in maintaining a healthier weight and explored viable options.    Most recent labs:  Lab Results   Component Value Date    HGB 12.6 09/19/2019       Lab Results   Component Value Date    TSH 1.25 09/19/2019         DIETARY HISTORY  Meals Per  "Day: 3  Eating Protein First?: yes  Food Diary: B:toast [employer],  fruitsL:ham sandwich occ fast food, (1-2/wk) D:[employer] fair yesterday. Going again on Friday. Had Pronto Pup, shared fries with boyfriend, sweet [employer] cookies  Snacks Per Day: not really  Typical Snack: chips or cereal   Fluid Intake: intentional water now which Is a good change  Portion Control: improved  Calorie Containing Beverages: no  Alcohol per week: rare \"occ\"  drinks carbless now can drink only 1  Typical Protein Food Choices: mostly chicken occ beef  Choosing Whole Grains: yes when she can  Grocery Shopping is done by: her dad and her mom  Food Preparation is done by: shared  Meals at Restaurant/Cafeteria/Take Out Per Week: 1-2X/wk  Eating at the Table:   TV is Off During Meals:     Positive Changes Since Last Visit: more water, more physical activity, no soda  Struggling With: physical activity leads to foot pain    Knowledgeable in Reading Food Labels: yes  Getting Adequate Protein: yes  Sleeping 7-8 hours/day well, better than she used to  Stress management school started. Philosophy, ceramics, history    PHYSICAL ACTIVITY PATTERNS:  Cardiovascular: ADLs and walks the dogs, yoga you tube tutorial  Strength Training: yoga    REVIEW OF SYSTEMS    PHYSICAL EXAM: (most recent vitals and today's stated weight)  Vitals: Ht 1.702 m (5' 7\")   Wt 117.9 kg (260 lb)   BMI 40.72 kg/m        GEN: Pleasant and in no acute distress  PSYCH: A&OX3,     I have reviewed the note as documented above.  This accurately captures the substance of my conversation with the patient.  Thank you for the opportunity to participate in the care of your patient.    Frida Mathew MD, FAAFP  [facility]  Diplomate, American Board of Obesity Medicine    Total time spent on the date of this encounter doing: chart review, review of test results, patient visit, physical exam, education, counseling, developing plan of care, and documenting = 30 " minutes.          Video-Visit Details    Type of service:  Video Visit    Platform used for Video Visit: [employer]    Video End Time (time video stopped):  12:00    Originating Location (pt. Location): Home      Distant Location (provider location):  On-site    Distant Location (provider location):  [facility]IATRICS CARE [place]        Again, thank you for allowing me to participate in the care of your patient.        Sincerely,        Frida Mathew MD

## 2023-08-30 NOTE — PROGRESS NOTES
Makeda Sanches is 23 year old  female who presents for a billable video visit today.    How would you like to obtain your AVS? MyChart  If dropped from the video visit, the video invitation should be resent by: Send to e-mail at: [email or web address]  Will anyone else be joining your video visit? No      Video Start Time:  11:30    Are there any specific questions or needs that you would like addressed at your visit today?  Pt states no questions or concerns. Follow up made    Bariatric Follow-up    23 year old  female BMI:Body mass index is 40.72 kg/m .    Weight:   Wt Readings from Last 1 Encounters:   08/30/23 117.9 kg (260 lb)    pounds    Comorbidities:  Patient Active Problem List   Diagnosis    Keratosis Pilaris    Murmurs    ADD (attention deficit disorder)    Vitamin D deficiency    Family history of hypothyroidism    Controlled substance agreement signed-CSA and urine tox done for Adderall XR 30 mg and 10 mg 7-11-22    Seasonal allergic rhinitis    Eczema    Hirsutism    Obesity    Fatigue         Interim: No soda, lower sugars. More active. Worked a lot this summer. . 3-12 on her feet all day. Taking the metformin. Wegovy causes constipation. Using prune juice which works. Drinking a lot of water and milk Skim. Started 1.0mg on Monday.    Plan:  DIET  Continue hydration. Congratulated on    EXERCISE Explored winter and long term exercise plan and plan when moves to [place]   PHARMACOTHERAPY increase Wegovy in 3 weeks to 1.7 then 2.mg    stay ahead of constipation.     -We reviewed her medications and whether associated with weight gain.    Current Outpatient Medications:     amphetamine-dextroamphetamine (ADDERALL XR) 10 MG 24 hr capsule, Take 1 capsule (10 mg) by mouth daily, Disp: 90 capsule, Rfl: 0    amphetamine-dextroamphetamine (ADDERALL XR) 30 MG 24 hr capsule, Take 1 capsule (30 mg) by mouth daily, Disp: 90 capsule, Rfl: 0    metFORMIN (GLUCOPHAGE XR) 500 MG 24 hr tablet,  Take 1 tablet (500 mg) by mouth 2 times daily (with meals), Disp: 180 tablet, Rfl: 3    Semaglutide-Weight Management (WEGOVY) 1 MG/0.5ML pen, Inject 1 mg Subcutaneous once a week, Disp: 2 mL, Rfl: 0    [START ON 9/18/2023] Semaglutide-Weight Management (WEGOVY) 1.7 MG/0.75ML pen, Inject 1.7 mg Subcutaneous once a week for 28 days, Disp: 3 mL, Rfl: 0    [START ON 10/16/2023] Semaglutide-Weight Management (WEGOVY) 2.4 MG/0.75ML pen, Inject 2.4 mg Subcutaneous once a week, Disp: 9 mL, Rfl: 3    vitamin B-12 (CYANOCOBALAMIN) 2500 MCG sublingual tablet, Take 1 tablet (2,500 mcg) by mouth daily, Disp: 90 tablet, Rfl: 3      We discussed [facility] Bariatric Basics including:  -eating 3 meals daily  -eating protein first  -eating slowly, chewing food well  -avoiding/limiting calorie containing beverages  -choosing wheat, not white with breads, crackers, pastas, suresh, bagels, tortillas, rice  -limiting restaurant or cafeteria eating to twice a week or less  -We discussed the importance of restorative sleep and stress management in maintaining a healthy weight.  -We discussed insulin resistance and glycemic index as it relates to appetite and weight control  -We discussed the National Weight Control Registry healthy weight maintenance strategies and ways to optimize metabolism.  -We discussed the importance of physical activity including cardiovascular and strength training in maintaining a healthier weight and explored viable options.    Most recent labs:  Lab Results   Component Value Date    HGB 12.6 09/19/2019       Lab Results   Component Value Date    TSH 1.25 09/19/2019         DIETARY HISTORY  Meals Per Day: 3  Eating Protein First?: yes  Food Diary: B:toast [employer],  fruitsL:ham sandwich occ fast food, (1-2/wk) D:[employer] fair yesterday. Going again on Friday. Had Pronto Pup, shared fries with boyfriend, sweet [employer] cookies  Snacks Per Day: not really  Typical Snack: chips or cereal   Fluid Intake: intentional water now  "which Is a good change  Portion Control: improved  Calorie Containing Beverages: no  Alcohol per week: rare \"occ\"  drinks carbless now can drink only 1  Typical Protein Food Choices: mostly chicken occ beef  Choosing Whole Grains: yes when she can  Grocery Shopping is done by: her dad and her mom  Food Preparation is done by: shared  Meals at Restaurant/Cafeteria/Take Out Per Week: 1-2X/wk  Eating at the Table:   TV is Off During Meals:     Positive Changes Since Last Visit: more water, more physical activity, no soda  Struggling With: physical activity leads to foot pain    Knowledgeable in Reading Food Labels: yes  Getting Adequate Protein: yes  Sleeping 7-8 hours/day well, better than she used to  Stress management school started. Philosophy, ceramics, history    PHYSICAL ACTIVITY PATTERNS:  Cardiovascular: ADLs and walks the dogs, yoga you tube tutorial  Strength Training: yoga    REVIEW OF SYSTEMS    PHYSICAL EXAM: (most recent vitals and today's stated weight)  Vitals: Ht 1.702 m (5' 7\")   Wt 117.9 kg (260 lb)   BMI 40.72 kg/m        GEN: Pleasant and in no acute distress  PSYCH: A&OX3,     I have reviewed the note as documented above.  This accurately captures the substance of my conversation with the patient.  Thank you for the opportunity to participate in the care of your patient.    Frida Mathew MD, FAAFP  [facility]  Diplomate, American Board of Obesity Medicine    Total time spent on the date of this encounter doing: chart review, review of test results, patient visit, physical exam, education, counseling, developing plan of care, and documenting = 30 minutes.          Video-Visit Details    Type of service:  Video Visit    Platform used for Video Visit: [employer]    Video End Time (time video stopped):  12:00    Originating Location (pt. Location): Home      Distant Location (provider location):  On-site    Distant Location (provider location):  [facility] SURGERY CLINIC " AND BARIATRICS CARE [place]

## 2023-09-17 ENCOUNTER — HEALTH MAINTENANCE LETTER (OUTPATIENT)
Age: 23
End: 2023-09-17

## 2023-09-22 ENCOUNTER — HOSPITAL ENCOUNTER (EMERGENCY)
Facility: HOSPITAL | Age: 23
Discharge: HOME | End: 2023-09-22
Attending: EMERGENCY MEDICINE | Admitting: EMERGENCY MEDICINE
Payer: COMMERCIAL

## 2023-09-22 ENCOUNTER — APPOINTMENT (OUTPATIENT)
Dept: ULTRASOUND IMAGING | Facility: HOSPITAL | Age: 23
End: 2023-09-22
Attending: EMERGENCY MEDICINE
Payer: COMMERCIAL

## 2023-09-22 VITALS
HEART RATE: 86 BPM | OXYGEN SATURATION: 99 % | SYSTOLIC BLOOD PRESSURE: 136 MMHG | BODY MASS INDEX: 36.1 KG/M2 | WEIGHT: 230 LBS | HEIGHT: 67 IN | RESPIRATION RATE: 16 BRPM | DIASTOLIC BLOOD PRESSURE: 77 MMHG | TEMPERATURE: 97.7 F

## 2023-09-22 DIAGNOSIS — K80.20 CALCULUS OF GALLBLADDER WITHOUT CHOLECYSTITIS WITHOUT OBSTRUCTION: ICD-10-CM

## 2023-09-22 LAB
ALBUMIN SERPL BCG-MCNC: 4.5 G/DL (ref 3.5–5.2)
ALP SERPL-CCNC: 84 U/L (ref 35–104)
ALT SERPL W P-5'-P-CCNC: 24 U/L (ref 0–50)
ANION GAP SERPL CALCULATED.3IONS-SCNC: 14 MMOL/L (ref 7–15)
AST SERPL W P-5'-P-CCNC: 34 U/L (ref 0–45)
BASOPHILS # BLD AUTO: 0.1 10E3/UL (ref 0–0.2)
BASOPHILS NFR BLD AUTO: 0 %
BILIRUB SERPL-MCNC: 0.6 MG/DL
BUN SERPL-MCNC: 12.4 MG/DL (ref 6–20)
CALCIUM SERPL-MCNC: 9.5 MG/DL (ref 8.6–10)
CHLORIDE SERPL-SCNC: 102 MMOL/L (ref 98–107)
CREAT SERPL-MCNC: 0.88 MG/DL (ref 0.51–0.95)
DEPRECATED HCO3 PLAS-SCNC: 23 MMOL/L (ref 22–29)
EGFRCR SERPLBLD CKD-EPI 2021: >90 ML/MIN/1.73M2
EOSINOPHIL # BLD AUTO: 1 10E3/UL (ref 0–0.7)
EOSINOPHIL NFR BLD AUTO: 6 %
ERYTHROCYTE [DISTWIDTH] IN BLOOD BY AUTOMATED COUNT: 12.2 % (ref 10–15)
GLUCOSE SERPL-MCNC: 123 MG/DL (ref 70–99)
HCG SERPL QL: NEGATIVE
HCT VFR BLD AUTO: 45.3 % (ref 35–47)
HGB BLD-MCNC: 15.4 G/DL (ref 11.7–15.7)
HOLD SPECIMEN: NORMAL
IMM GRANULOCYTES # BLD: 0.1 10E3/UL
IMM GRANULOCYTES NFR BLD: 1 %
LIPASE SERPL-CCNC: 43 U/L (ref 13–60)
LYMPHOCYTES # BLD AUTO: 2.8 10E3/UL (ref 0.8–5.3)
LYMPHOCYTES NFR BLD AUTO: 18 %
MCH RBC QN AUTO: 27.8 PG (ref 26.5–33)
MCHC RBC AUTO-ENTMCNC: 34 G/DL (ref 31.5–36.5)
MCV RBC AUTO: 82 FL (ref 78–100)
MONOCYTES # BLD AUTO: 0.7 10E3/UL (ref 0–1.3)
MONOCYTES NFR BLD AUTO: 5 %
NEUTROPHILS # BLD AUTO: 10.8 10E3/UL (ref 1.6–8.3)
NEUTROPHILS NFR BLD AUTO: 70 %
NRBC # BLD AUTO: 0 10E3/UL
NRBC BLD AUTO-RTO: 0 /100
PLATELET # BLD AUTO: 379 10E3/UL (ref 150–450)
POTASSIUM SERPL-SCNC: 3.3 MMOL/L (ref 3.4–5.3)
PROT SERPL-MCNC: 7.3 G/DL (ref 6.4–8.3)
RBC # BLD AUTO: 5.54 10E6/UL (ref 3.8–5.2)
SODIUM SERPL-SCNC: 139 MMOL/L (ref 136–145)
WBC # BLD AUTO: 15.3 10E3/UL (ref 4–11)

## 2023-09-22 PROCEDURE — 36415 COLL VENOUS BLD VENIPUNCTURE: CPT | Performed by: EMERGENCY MEDICINE

## 2023-09-22 PROCEDURE — 99284 EMERGENCY DEPT VISIT MOD MDM: CPT | Mod: 25

## 2023-09-22 PROCEDURE — 84703 CHORIONIC GONADOTROPIN ASSAY: CPT | Performed by: EMERGENCY MEDICINE

## 2023-09-22 PROCEDURE — 83690 ASSAY OF LIPASE: CPT | Performed by: EMERGENCY MEDICINE

## 2023-09-22 PROCEDURE — 80053 COMPREHEN METABOLIC PANEL: CPT | Performed by: EMERGENCY MEDICINE

## 2023-09-22 PROCEDURE — 76705 ECHO EXAM OF ABDOMEN: CPT

## 2023-09-22 PROCEDURE — 85025 COMPLETE CBC W/AUTO DIFF WBC: CPT | Performed by: EMERGENCY MEDICINE

## 2023-09-22 ASSESSMENT — ACTIVITIES OF DAILY LIVING (ADL): ADLS_ACUITY_SCORE: 33

## 2023-09-22 NOTE — ED NOTES
She was prescribed Prilosec a week ago for the same issue. She states she has been taking it. Her pain comes and goes. It is not constant.

## 2023-09-22 NOTE — TELEPHONE ENCOUNTER
REFERRAL INFORMATION:  Referring Provider: Dr. Sidney Demarco  Referring Clinic: [facility] - ED  Reason for Visit/Diagnosis: Calculus of gallbladder w/o cholecystitis or obstruction       FUTURE VISIT INFORMATION:  Appointment Date: 9/28/2023  Appointment Time: 1 PM     NOTES RECORD STATUS  DETAILS   OFFICE NOTE from Referring Provider N/A    OFFICE NOTE from Other Specialists Care Everywhere / Internal [facility]:  9/17/23 - UC OV with GRACIE Lew    [facility]:  8/30/23, 5/30/23 - WEIGHT OV with  [facility] DISCHARGE SUMMARY/ ED VISITS  Care Everywhere / Internal [facility]:  9/22/23 - ED OV with Dr. Demarco    [facility]:  3/9/21 - ED OV with Dr. Oh  3/5/21 - ED OV with Dr. Barnard   OPERATIVE REPORT N/A    PERTINENT LABS Care Everywhere / Internal    IMAGING (CT, MRI, US, XR)  Received / Internal [facility]:  9/22/23 - US Abdomen    [facility]:  3/9/21- US Pelvis     Records Requested    Facility  [facility]  Fax: [phone]   Outcome * 9/22/23 11 AM Faxed urg req to  for images to be pushed to [facility]. - Felipa    * 9/25/23 8:31 AM Images received from  and attached to the patient in PACs. Elsie Leo

## 2023-09-22 NOTE — ED TRIAGE NOTES
Pt is here with father for c/o abd pain, nausea/vomiting: which she had to forced herself to vomit, and constipation. Pt states this started 3 weeks ago, comes and goes, until tonight that it became unbearable. Pt was seen at the urgent care on Sunday, was told it may be gastritis due to H. Pylori, took a stool sample but did not get results yet. Took tums 30 minutes ago.      Triage Assessment       Row Name 09/22/23 0436       Triage Assessment (Adult)    Airway WDL WDL       Respiratory WDL    Respiratory WDL WDL       Cardiac WDL    Cardiac WDL WDL

## 2023-09-22 NOTE — ED PROVIDER NOTES
EMERGENCY DEPARTMENT ENCOUNTER      NAME: Makeda Sanches  AGE: 23 year old female  YOB: 2000  MRN: [id number]  EVALUATION DATE & TIME: 9/22/2023  4:40 AM    PCP: Anna Martinez    ED PROVIDER: Sidney Demarco M.D.      Chief Complaint   Patient presents with    Abdominal Pain    Nausea & Vomiting    Constipation         FINAL IMPRESSION:  1. Calculus of gallbladder without cholecystitis without obstruction          ED COURSE & MEDICAL DECISION MAKING:    Pertinent Labs & Imaging studies reviewed. (See chart for details)  23 year old female presents to the Emergency Department for evaluation of nominal pain. Patient appears non toxic with stable vitals signs, patient afebrile with no tachycardia or hypoxia, no increased work of breathing.  Lungs clear and abdomen is benign.  Currently, patient states symptoms have resolved.  Per review of the medical record patient was seen on 9/17/2023 through [facility] at a urgent care, laboratory studies they are unremarkable per report patient was sent home with Prilosec for possible gastritis or GERD.  No reports of chest pain, shortness of breath, diaphoresis, no pleurisy or hypoxia.  Certainly nothing to suggest atypical ACS, PE, dissection, esophageal rupture, no focal tenderness suggest appendicitis, diverticulitis, nothing is just GI bleed, mesenteric ischemia, no flank pain to suggest nephrolithiasis or pyelonephritis, no urinary symptoms to suggest cystitis.  Considered CT imaging the abdomen pelvis but she has no focal tenderness to suggest appendicitis and certainly nothing just bowel obstruction or GI bleed, no indication for emergent CT imaging, also consider radiation exposure in the setting of benign abdomen.  Considered but overall low suspicion for cholecystitis or pancreatitis, did consider possibly biliary colic.  Also certainly considered gastritis or GERD, PUD.  Again nothing to suggest atypical ACS or dissection,  lungs clear with no fever or cough to suggest pneumonia, CHF, COVID.  No pelvic symptoms, no reports of vaginal bleeding or discharge suggest PID, STI, tubo-ovarian abscess or ovarian cyst or torsion.  We will obtain screening labs and ultrasound imaging.  Again no urinary symptoms to suggest nephrolithiasis, pyelonephritis or cystitis.  Patient was offered but deferred pain medications here as again states pain is now resolved.    Reassessment: Labs by my independent interpretation showed no acute concerning findings, did note potassium 3.3 and will recommend repletion through dietary changes.  Otherwise no elevated bilirubin, alk phos, LFTs or lipase to suggest pancreatic or biliary obstruction.  Pregnancy negative.  Did note white blood cell count of 15.3 but no fever or other infectious symptoms found likely secondary to stress demargination.  Ultrasound imaging reported cholelithiasis but no other acute concerning findings.  On repeat exam patient remains symptom-free, exam is benign and vitals reassuring.  Suspect symptomatic cholelithiasis without signs of infection or obstruction here feel she is safe for discharge and close follow-up.  Will recommend dietary changes and close follow-up with general surgery for continued outpatient management evaluation in the next 5 to 7 days.  Discussed all these findings recommendations with the patient felt reassured comfortable with discharge.  Return precautions provided.    Medical Decision Making    History:  Supplemental history from: Documented in chart, if applicable  External Record(s) reviewed: Documented in chart, if applicable. and Other: Urgent Care visit on 9/17/23    Work Up:  Chart documentation includes differential considered and any EKGs or imaging independently interpreted by provider, where specified.  In additional to work up documented, I considered the following work up: Documented in chart, if applicable.    External consultation:  Discussion of  "management with another provider: Documented in chart, if applicable    Complicating factors:  Care impacted by chronic illness: N/A  Care affected by social determinants of health: N/A    Disposition considerations: Discharge. No recommendations on prescription strength medication(s). See documentation for any additional details.      5:00 AM I met with the patient to gather history and perform my exam. ED course and treatment discussed.   6:24 AM repeat exam is benign.  Discussed findings and discharge with close follow-up.      At the conclusion of the encounter I discussed the results of all of the tests and the disposition. The questions were answered and return precautions provided. The patient or family acknowledged understanding and was agreeable with the care plan.         MEDICATIONS GIVEN IN THE EMERGENCY:  Medications - No data to display    NEW PRESCRIPTIONS STARTED AT TODAY'S ER VISIT  Discharge Medication List as of 9/22/2023  6:45 AM               =================================================================    HPI    Patient information was obtained from: Patient     Use of Intrepreter: N/A         Makeda Sanches is a 23 year old female who presents with abdominal pain     Per chart review: The patient was seen at [facility] on 9/17/23 for abdominal pain, diarrhea, and constipation. Patient's lipase within normal limits. Patient treated with GI cocktail. Patient gave stool sample that has not resulted yet.     The patient reports three weeks of intermittent \"crampy\" and \"tight\" epigastric abdominal pain. The patient reports normally the pain will last about 45 minutes, but this morning around 1:30 AM, the pain came back and lasted two hours. The patient notes the pain has improved right now after taking Tums. The patient notes the pain improves after vomiting and worsens with movement. The patient also reports some constipation and back pain which is now. The " patient denies hematuria, vaginal bleeding, vaginal discharge, dysuria, fever, or any other symptoms or complaints.     REVIEW OF SYSTEMS   Constitutional:  Denies fever, chills  Respiratory:  Denies productive cough or increased work of breathing  Cardiovascular:  Denies chest pain, palpitations  GI: Positive for abdominal pain, constipation, vomiting   Musculoskeletal:  Denies any new muscle/joint swelling  Skin:  Denies rash   Neurologic:  Denies focal weakness  All systems negative except as marked.     PAST MEDICAL HISTORY:  Past Medical History:   Diagnosis Date    Eczema     Elevated blood pressure reading without diagnosis of hypertension     Fatigue     Hirsutism     Obesity     Seasonal allergic rhinitis        PAST SURGICAL HISTORY:  Past Surgical History:   Procedure Laterality Date    HC OR DEVICE CONTRACEPTIVE MIRENA IUD 5YR DURATION  02/2021    MOUTH SURGERY  08/01/2019    Jennings teeth removed    TONSILLECTOMY Bilateral 01/03/2016         CURRENT MEDICATIONS:    Prior to Admission medications    Medication Sig Start Date End Date Taking? Authorizing Provider   amphetamine-dextroamphetamine (ADDERALL XR) 10 MG 24 hr capsule Take 1 capsule (10 mg) by mouth daily 7/16/23   Anna Martinez MD   amphetamine-dextroamphetamine (ADDERALL XR) 30 MG 24 hr capsule Take 1 capsule (30 mg) by mouth daily 7/16/23   Anna Martinez MD   metFORMIN (GLUCOPHAGE XR) 500 MG 24 hr tablet Take 1 tablet (500 mg) by mouth 2 times daily (with meals) 5/30/23 5/29/24  [place] Frida Mathew MD   Semaglutide-Weight Management (WEGOVY) 1 MG/0.5ML pen Inject 1 mg Subcutaneous once a week 8/25/23   [place] Frida Mathew MD   Semaglutide-Weight Management (WEGOVY) 1.7 MG/0.75ML pen Inject 1.7 mg Subcutaneous once a week for 28 days 9/18/23 10/16/23  [place] Frida Mathew MD   Semaglutide-Weight Management (WEGOVY) 2.4 MG/0.75ML pen Inject 2.4 mg Subcutaneous once a week 10/16/23 10/15/24  [place] Frida Mathew  "MD Bianca   vitamin B-12 (CYANOCOBALAMIN) 2500 MCG sublingual tablet Take 1 tablet (2,500 mcg) by mouth daily 5/30/23   [facility] Frida Valenzuela MD        ALLERGIES:  No Known Allergies    FAMILY HISTORY:  Family History   Problem Relation Age of Onset    Obesity Mother     Hypothyroidism Mother     Obesity Father     Diabetes Father     Hypertension Father     No Known Problems Sister     No Known Problems Sister     Hypertension Paternal Grandmother        SOCIAL HISTORY:   Social History     Socioeconomic History    Marital status: Single   Tobacco Use    Smoking status: Never    Smokeless tobacco: Never   Social History Narrative    Twin sister Shruthi  2 younger sisters and Dad and Mom at home.  2 dogs Going to [place] switching to [employer]. Psychology major     Social Determinants of Health     Housing Stability: Unknown (7/11/2022)    Housing Stability Vital Sign     Unable to Pay for Housing in the Last Year: No     Unstable Housing in the Last Year: No       VITALS:  Patient Vitals for the past 24 hrs:   BP Temp Temp src Pulse Resp SpO2 Height Weight   09/22/23 0432 136/77 97.7  F (36.5  C) Oral 86 16 99 % 1.702 m (5' 7\") 104.3 kg (230 lb)        PHYSICAL EXAM    Constitutional:  Awake, alert, in no apparent distress  HENT:  Normocephalic, Atraumatic. Bilateral external ears normal. Oropharynx moist. Nose normal. Neck- Normal range of motion with no guarding, No midline cervical tenderness, Supple, No stridor.   Eyes:  PERRL, EOMI with no signs of entrapment, Conjunctiva normal, No discharge.   Respiratory:  Normal breath sounds, No respiratory distress, No wheezing.    Cardiovascular:  Normal heart rate, Normal rhythm, No appreciable rubs or gallops.   GI:  Soft, No tenderness, No distension, No palpable masses  Musculoskeletal:  Intact distal pulses, No edema. Good range of motion in all major joints. No tenderness to palpation or major deformities noted.  Integument:  Warm, Dry, No erythema, No " rash.   Neurologic:  Alert & oriented, Normal motor function, Normal sensory function, No focal deficits noted.   Psychiatric:  Affect normal, Judgment normal, Mood normal.     LAB:  All pertinent labs reviewed and interpreted.  Results for orders placed or performed during the hospital encounter of 09/22/23   US Abdomen Limited (RUQ)    Impression    IMPRESSION:  1.  Cholelithiasis.       Comprehensive metabolic panel   Result Value Ref Range    Sodium 139 136 - 145 mmol/L    Potassium 3.3 (L) 3.4 - 5.3 mmol/L    Chloride 102 98 - 107 mmol/L    Carbon Dioxide (CO2) 23 22 - 29 mmol/L    Anion Gap 14 7 - 15 mmol/L    Urea Nitrogen 12.4 6.0 - 20.0 mg/dL    Creatinine 0.88 0.51 - 0.95 mg/dL    Calcium 9.5 8.6 - 10.0 mg/dL    Glucose 123 (H) 70 - 99 mg/dL    Alkaline Phosphatase 84 35 - 104 U/L    AST 34 0 - 45 U/L    ALT 24 0 - 50 U/L    Protein Total 7.3 6.4 - 8.3 g/dL    Albumin 4.5 3.5 - 5.2 g/dL    Bilirubin Total 0.6 <=1.2 mg/dL    GFR Estimate >90 >60 mL/min/1.73m2   Result Value Ref Range    Lipase 43 13 - 60 U/L   HCG QUALitative pregnancy (blood)   Result Value Ref Range    hCG Serum Qualitative Negative Negative   Extra Blue Top Tube   Result Value Ref Range    Hold Specimen JIC    Extra Red Top Tube   Result Value Ref Range    Hold Specimen JIC    Extra Green Top (Lithium Heparin) Tube   Result Value Ref Range    Hold Specimen JIC    CBC with platelets and differential   Result Value Ref Range    WBC Count 15.3 (H) 4.0 - 11.0 10e3/uL    RBC Count 5.54 (H) 3.80 - 5.20 10e6/uL    Hemoglobin 15.4 11.7 - 15.7 g/dL    Hematocrit 45.3 35.0 - 47.0 %    MCV 82 78 - 100 fL    MCH 27.8 26.5 - 33.0 pg    MCHC 34.0 31.5 - 36.5 g/dL    RDW 12.2 10.0 - 15.0 %    Platelet Count 379 150 - 450 10e3/uL    % Neutrophils 70 %    % Lymphocytes 18 %    % Monocytes 5 %    % Eosinophils 6 %    % Basophils 0 %    % Immature Granulocytes 1 %    NRBCs per 100 WBC 0 <1 /100    Absolute Neutrophils 10.8 (H) 1.6 - 8.3 10e3/uL    Absolute  Lymphocytes 2.8 0.8 - 5.3 10e3/uL    Absolute Monocytes 0.7 0.0 - 1.3 10e3/uL    Absolute Eosinophils 1.0 (H) 0.0 - 0.7 10e3/uL    Absolute Basophils 0.1 0.0 - 0.2 10e3/uL    Absolute Immature Granulocytes 0.1 <=0.4 10e3/uL    Absolute NRBCs 0.0 10e3/uL       RADIOLOGY:  US Abdomen Limited (RUQ)   Final Result   IMPRESSION:   1.  Cholelithiasis.                   EKG:      I have independently reviewed and interpreted the EKG(s) documented above.    PROCEDURES:            I, Ana M Estrella, am serving as a scribe to document services personally performed by Sidney Demarco MD, based on my observation and the provider's statements to me. I, Sidney Demarco MD attest that Ana M Estrella is acting in a scribe capacity, has observed my performance of the services and has documented them in accordance with my direction.    Sidney Demarco M.D.  Emergency Medicine  [facility] EMERGENCY DEPARTMENT  [facility] [facility] [address]  [phone]  Dept: [phone]     Sidney Demarco MD  09/22/23 5883

## 2023-09-27 ENCOUNTER — PRE VISIT (OUTPATIENT)
Dept: SURGERY | Facility: CLINIC | Age: 23
End: 2023-09-27
Payer: COMMERCIAL

## 2023-09-27 NOTE — TELEPHONE ENCOUNTER
Patient Telephone Reminder Call    Date of call:  09/27/23  Phone numbers:  Cell number on file:    Telephone Information:   Mobile [phone]       Reached patient/confirmed appointment:  Yes  Appointment with:   Dr. Demetrius Bean  Reason for visit:  Calculus of gallbladder  Remind patient that this visit is a consultation only, NO procedure:  Yes  Can this visit be changed to a video visit:  Yes  Pt wanted to change visit to video

## 2023-09-28 ENCOUNTER — VIRTUAL VISIT (OUTPATIENT)
Dept: SURGERY | Facility: CLINIC | Age: 23
End: 2023-09-28
Attending: EMERGENCY MEDICINE
Payer: COMMERCIAL

## 2023-09-28 ENCOUNTER — PRE VISIT (OUTPATIENT)
Dept: SURGERY | Facility: CLINIC | Age: 23
End: 2023-09-28

## 2023-09-28 VITALS — WEIGHT: 228 LBS | BODY MASS INDEX: 35.79 KG/M2 | HEIGHT: 67 IN

## 2023-09-28 DIAGNOSIS — K80.20 GALLSTONES: Primary | ICD-10-CM

## 2023-09-28 PROCEDURE — 99203 OFFICE O/P NEW LOW 30 MIN: CPT | Mod: 95 | Performed by: SURGERY

## 2023-09-28 RX ORDER — NITROFURANTOIN MACROCRYSTAL 100 MG
100 CAPSULE ORAL 4 TIMES DAILY
COMMUNITY
End: 2024-01-02

## 2023-09-28 RX ORDER — CEFAZOLIN SODIUM 2 G/50ML
2 SOLUTION INTRAVENOUS SEE ADMIN INSTRUCTIONS
Status: CANCELLED | OUTPATIENT
Start: 2023-09-28

## 2023-09-28 RX ORDER — INDOCYANINE GREEN AND WATER 25 MG
2.5 KIT INJECTION ONCE
Status: CANCELLED | OUTPATIENT
Start: 2023-09-28 | End: 2023-09-28

## 2023-09-28 RX ORDER — CEFAZOLIN SODIUM 2 G/50ML
2 SOLUTION INTRAVENOUS
Status: CANCELLED | OUTPATIENT
Start: 2023-09-28

## 2023-09-28 ASSESSMENT — PAIN SCALES - GENERAL: PAINLEVEL: NO PAIN (0)

## 2023-09-28 NOTE — PROGRESS NOTES
Makeda Sanches is a 23 year old who is being evaluated via a billable video visit.      How would you like to obtain your AVS? MyChart  If the video visit is dropped, the invitation should be resent by: Text to cell phone: [phone]  Will anyone else be joining your video visit? No  If patient encounters technical issues they should call [phone]    During this virtual visit the patient is located in [place], patient verifies this as the location during the entirety of this visit.     Video-Visit Details  Makeda Sanches is a 23 year old female with a 1 month history of abdominal pain localized to the right upper quadrant.  Symptoms are associated with fatty food intake.  Associated symptoms: nausea and vomiting  Common duct symptoms:  None  Diet tolerance:  good  Patient was seen in the Emergency Room for these symptoms.  LFT's:  normal    Image studies included:  Ultrasound  Findings:  Gallstones seen    Past medical and surgical history, medications, allergies, family history, and social history were reviewed with the patient. 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    ROS: 10 point review of systems negative except noted in HPI  Impression:  Symptomatic cholelithiasis  Recommendation:  Laparoscopic Cholecystectomy robot assisted.     Low to nonfat diet until the patient undergoes surgery.    A full discussion regarding the alternatives, risks, goals, and potential complications for this surgery was completed today.  The patient understood that the potential problems included but are not limited to:  Infection, bleeding, bile leak, injury to structures about the gallbladder, possible common duct stone requiring further procedures. Most current review of literature confirm the more common specific risks related to laparoscopic cholecystectomy include bile duct injury (3/1000), bile leak (10/1000), retained common bile duct stone (10/1000), postcholecystectomy diarrhea (1-2%) and these complications may require  additional treatment.    (To be complete the exhaustive list of possible walter operative issues includes the following.  Bile leak, chronic pain, deep vein thrombosis, nerve entrapment, food intolerances that may be temporary or permanent, misdiagnosis (from pathology of nearby organs: liver, stomach, duodenum, pancreas, colon, small bowel), pain/opioid medicine addiction (heroin addiction), change in tastes to certain foods, temporary or permanent types of various diarrhea, seroma formation, possible drain placement, post operative pain, hernia formation, scar formation, deep and superficial infections, intra abdominal infections or abscesses or biloma or fluid collections, superficial and deep bleeding, injury to the liver/pancreas/stomach/duodenum, injury to small and large intestines, need to convert to open cholecystectomy, need for additional procedures that may be invasive or not such as ERCP, possible common hepatic duct, common bile duct, or right hepatic duct or left hepatic duct injury or any accessory or aberrant ducts that may be present, possible injury to right and left hepatic artery or any accessesory or aberrant arteries in the near vicinity, post operative ductal scarring or stricturing that may need to be intervened on, choledocholithiasis, remnant stones or spilled stones that need to be extracted at a later time point with additional interventions, strokes, pneumonia, heart attack/myocardial infarction, brain anoxia, death from the operation or anesthesia or a complication that ensued from the operation, corrective operations that could cause life changing alterations, etc.)    The patient verbally expressed understanding, was given the opportunity for questions, and gives full informed consent for the procedure.      Today the patient was instructed on the need for a preoperative H&P, NPO status prior to surgery, and the need to stop anticoagulants prior to surgery.  Additional educational  material, soap, and instructions will be mailed out to the patients home.    The total time spent with this patient was 30 minutes.  The total time was spent on the date of encounter doing chart review, history and physical, dressing changes, documentation, patient education, and any further activity as noted above.    Lovenox:  No      Originating Location (pt. Location): Home    Distant Location (provider location):  On-site    Platform used for Video Visit: IRAJ Abad 9/28/2023      12:31 PM

## 2023-09-28 NOTE — PATIENT INSTRUCTIONS
You met with Dr. Demetrius Bean.      Today's visit instructions:    Reminder:  Surgery Requirements  Your surgery will be at [facility]- 5th Floor.  You will need to arrive 1.5  hours early.  You will need someone to drive you home (over 18 years old) and stay with you for 24 hours after the procedure.  You will need a preop physical with your regular doctor within 30 days of surgery- closer is always better.  Stop any blood thinners, vitamins, minerals, or herbal supplements 5 days before surgery.  If you are taking a prescribed blood thinner please let us know for specific instructions. You need to hold your Wegovy for one week prior to surgery.   Fasting- a nurse from [facility] will call you 1-2 days before surgery to confirm your procedure and tell you when to stop eating and drinking.   Wash with the soap (Antibacterial Dial Foaming Complete , Hibiclense) the night before surgery and morning of surgery. See instructions in the Surgery Packet.  If you would like a procedure estimate please call Cost of Care at [phone] during the hours of 8 AM - 3 PM (option #2 for on-line request and option #3 for a representative).        If you have questions please contact Daniella RN or Adelina RN during regular clinic hours, Monday through Friday 7:30 AM - 4:00 PM, or you can contact us via [employer] at anytime.       If you have urgent needs after-hours, weekends, or holidays please call the hospital at [phone] and ask to speak with our on-call General Surgery Team.    Appointment [phone]  Nurse Advice (Daniella or Adelina): [phone]   Surgery Scheduler (Lanette): [phone]  Fax: [phone]    After your Robotic Cholecystectomy          Incision care   You may take a shower the day after surgery. Carefully wash your incision with soap and water. Do not submerge yourself in water (bath, whirlpool, hot tub, pool, lake) for 14 days after surgery.   Remove the  bandage the day after surgery, but leave the medical tape (Steri-Strips) or glue in place. These will loosen and fall off on their own 1-2 weeks after surgery.     Always wash your hands before touching your incisions or removing bandages.   It is not unusual to form a collection of fluid or blood under your incision that may feel firm or squishy- it can take several weeks to months for your body to reabsorb it.  At times, it may even drain.  If that should happen keep the area clean with soap, water,  and cover with a clean gauze dressing. You can change this daily or as needed.       Other medicines   Wait to start aspirin or blood thinners until the day after surgery. You can continue your regular medicines at your normal time the day after surgery.    Your pain medicine may cause constipation (hard, dry stools). To help with this, take the stool softener your doctor gave you or an over-the-counter stool softener or laxative. You can stop taking this when you are no longer taking pain medicine and your bowel movements are back to normal.      For pain or discomfort   Take the narcotic pain medicine your doctor gave you as needed and as instructed on the bottle. If you prefer to use over-the-counter medication, use acetaminophen (Tylenol) or ibuprofen (Advil, Motrin) as instructed on the box. Do not take Tylenol if it is in your narcotic pain medication.   Use an ice pack on your abdomen (belly) for 20 minutes at a time as needed for the first 24 hours. Be sure to protect your skin by putting a cloth between the ice pack and your skin.   After 24 hours you can switch to heat for 20 minutes as needed. Be sure to protect your skin by putting a cloth between the heat pack and your skin.   You may experience right shoulder pain after surgery which will go away 1-4 days after your procedure.  This is related to the gas that was used to inflate your abdomen, it gets trapped between your liver and diaphragm.  Walk  frequently and apply a heating pad (protecting your skin from the heating pad with a barrier such as a towel).       Activities   No driving until you feel it s safe to do so. Don t drive while taking narcotic pain medicine.   Don t lift anything heavier than 20 pounds for 3 to 4 weeks after surgery.      Special equipment   None     Diet   You can eat your regular meals after surgery.      When to call the doctor   Call your doctor if you have:   A fever above 101 F (38.3 C) (taken under the tongue), or a fever or chills lasting more than a day.   Redness at the incision site.   Any fluid or blood draining from the incision, especially if it smells bad.    Severe pain that doesn t improve with pain medicine.        We will call you 2 to 4 days after surgery to review this handout, answer questions and help arrange after-surgery care. If you have questions or concerns, please call [phone] during regular office hours. If you need to call after business hours, call [phone] and ask to page the surgeon on-call.     IMPORTANT:  Prior to your surgical procedure, a nurse will be contacting you to obtain a health history.   If they do not reach you by noon the day prior to your surgery, your surgery will be cancelled. If you have your Pre-Op Surgical Physical (H&P) with the Anesthesia Team (PAC), you are exempt from this call. Phone:  [phone] ([facility]) or [phone] ([place]).                Transversus Abdominis Plane (TAP) Pain Block      What is a TAP block?   A TAP block can help you manage your pain after surgery. TAP stands for transversus abdominis plane, which is a muscle layer in your abdomen (belly). The TAP block uses numbing medicine similar to Novocaine to block pain near the site of your surgery.       Why get a TAP block?   To better manage your pain after surgery. A tap block will help keep the pain from getting severe and out of control.   To block pain signals from the nerve, which helps  decrease pain after surgery.   To help you sleep, easily breathe deeply, walk and visit with others.      How is it done?   You will lie still on a table. We will use an ultrasound machine to help us see the correct muscle layer of your abdomen. Then, we ll use a needle to inject the medicine. We may also give you some sleep medicine to lessen the pain of the injection.       The procedure takes between 5 and 15 minutes. It is usually done right before surgery, but will sometimes be after. It depends on your surgery and care needs.      What can I expect?   You may feel numbness, tingling or a heaviness in your abdomen.    You may have pain control up to 72 hours after surgery.   The TAP block may not lessen all of your surgery pain. But most patients feel 50 to 75 percent less pain than without the block.       Tell your nurse if you have:   Numbness or tingling in areas other than where the injection was   Blurry vision   Ringing in your ears   A metallic taste in your mouth

## 2023-09-28 NOTE — NURSING NOTE
"Chief Complaint   Patient presents with    New Patient     Calculus of gallbladder       Vitals:    09/28/23 1230   Weight: 103.4 kg (228 lb)   Height: 1.702 m (5' 7\")       Body mass index is 35.71 kg/m .                          Rahul Amaro, EMT    "

## 2023-09-28 NOTE — LETTER
9/28/2023       RE: Makeda Sanches  [address] [address] [address]     Dear Colleague,    Thank you for referring your patient, Makeda Sanches, to the [facility] [place] at [facility]. Please see a copy of my visit note below.    Makeda Sanches is a 23 year old who is being evaluated via a billable video visit.      How would you like to obtain your AVS? MyChart  If the video visit is dropped, the invitation should be resent by: Text to cell phone: [phone]  Will anyone else be joining your video visit? No  If patient encounters technical issues they should call [phone]    During this virtual visit the patient is located in [place], patient verifies this as the location during the entirety of this visit.     Makeda Sanches is a 23 year old female with a 1 month history of abdominal pain localized to the right upper quadrant.  Symptoms are associated with fatty food intake.  Associated symptoms: nausea and vomiting  Common duct symptoms:  None  Diet tolerance:  good  Patient was seen in the Emergency Room for these symptoms.  LFT's:  normal    Image studies included:  Ultrasound  Findings:  Gallstones seen    Past medical and surgical history, medications, allergies, family history, and social history were reviewed with the patient. 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    ROS: 10 point review of systems negative except noted in HPI  Impression:  Symptomatic cholelithiasis  Recommendation:  Laparoscopic Cholecystectomy robot assisted.     Low to nonfat diet until the patient undergoes surgery.    A full discussion regarding the alternatives, risks, goals, and potential complications for this surgery was completed today.  The patient understood that the potential problems included but are not limited to:  Infection, bleeding, bile leak, injury to structures about the gallbladder, possible common duct stone requiring  further procedures. Most current review of literature confirm the more common specific risks related to laparoscopic cholecystectomy include bile duct injury (3/1000), bile leak (10/1000), retained common bile duct stone (10/1000), postcholecystectomy diarrhea (1-2%) and these complications may require additional treatment.    (To be complete the exhaustive list of possible walter operative issues includes the following.  Bile leak, chronic pain, deep vein thrombosis, nerve entrapment, food intolerances that may be temporary or permanent, misdiagnosis (from pathology of nearby organs: liver, stomach, duodenum, pancreas, colon, small bowel), pain/opioid medicine addiction (heroin addiction), change in tastes to certain foods, temporary or permanent types of various diarrhea, seroma formation, possible drain placement, post operative pain, hernia formation, scar formation, deep and superficial infections, intra abdominal infections or abscesses or biloma or fluid collections, superficial and deep bleeding, injury to the liver/pancreas/stomach/duodenum, injury to small and large intestines, need to convert to open cholecystectomy, need for additional procedures that may be invasive or not such as ERCP, possible common hepatic duct, common bile duct, or right hepatic duct or left hepatic duct injury or any accessory or aberrant ducts that may be present, possible injury to right and left hepatic artery or any accessesory or aberrant arteries in the near vicinity, post operative ductal scarring or stricturing that may need to be intervened on, choledocholithiasis, remnant stones or spilled stones that need to be extracted at a later time point with additional interventions, strokes, pneumonia, heart attack/myocardial infarction, brain anoxia, death from the operation or anesthesia or a complication that ensued from the operation, corrective operations that could cause life changing alterations, etc.)    The patient  verbally expressed understanding, was given the opportunity for questions, and gives full informed consent for the procedure.      Today the patient was instructed on the need for a preoperative H&P, NPO status prior to surgery, and the need to stop anticoagulants prior to surgery.  Additional educational material, soap, and instructions will be mailed out to the patients home.    The total time spent with this patient was 30 minutes.  The total time was spent on the date of encounter doing chart review, history and physical, dressing changes, documentation, patient education, and any further activity as noted above.    Chelsie:  Barbara Amaro, EMT 9/28/2023      12:31 PM      Again, thank you for allowing me to participate in the care of your patient.      Sincerely,    Demetrius Bean MD

## 2023-09-29 ENCOUNTER — TELEPHONE (OUTPATIENT)
Dept: SURGERY | Facility: CLINIC | Age: 23
End: 2023-09-29
Payer: COMMERCIAL

## 2023-09-29 PROBLEM — K80.20 GALLSTONES: Status: ACTIVE | Noted: 2023-09-28

## 2023-09-29 NOTE — TELEPHONE ENCOUNTER
Patient is scheduled for surgery with Dr. Bean    Spoke with: Makeda    Date of Surgery: 10/13/2023    Location: [facility]    Informed patient they will need an adult : Yes    Pre op with Provider n/a    H&P: Scheduled with PCP - Anna Martinez MD    Additional imaging/appointments: n/a    Surgery packet: To be sent via [employer]     Additional comments: n/a        Lanette Samson on 9/29/2023 at 9:07 AM

## 2023-10-03 ENCOUNTER — OFFICE VISIT (OUTPATIENT)
Dept: INTERNAL MEDICINE | Facility: CLINIC | Age: 23
End: 2023-10-03
Payer: COMMERCIAL

## 2023-10-03 VITALS
TEMPERATURE: 97.8 F | DIASTOLIC BLOOD PRESSURE: 62 MMHG | RESPIRATION RATE: 24 BRPM | HEART RATE: 104 BPM | BODY MASS INDEX: 41.83 KG/M2 | WEIGHT: 266.5 LBS | HEIGHT: 67 IN | OXYGEN SATURATION: 99 % | SYSTOLIC BLOOD PRESSURE: 120 MMHG

## 2023-10-03 DIAGNOSIS — E66.01 CLASS 3 SEVERE OBESITY WITHOUT SERIOUS COMORBIDITY WITH BODY MASS INDEX (BMI) OF 40.0 TO 44.9 IN ADULT, UNSPECIFIED OBESITY TYPE (H): ICD-10-CM

## 2023-10-03 DIAGNOSIS — Z87.440 RECENT URINARY TRACT INFECTION: ICD-10-CM

## 2023-10-03 DIAGNOSIS — Z01.818 PREOPERATIVE EXAMINATION: Primary | ICD-10-CM

## 2023-10-03 DIAGNOSIS — F98.8 ATTENTION DEFICIT DISORDER, UNSPECIFIED HYPERACTIVITY PRESENCE: ICD-10-CM

## 2023-10-03 DIAGNOSIS — K80.20 GALLSTONES: ICD-10-CM

## 2023-10-03 DIAGNOSIS — E66.813 CLASS 3 SEVERE OBESITY WITHOUT SERIOUS COMORBIDITY WITH BODY MASS INDEX (BMI) OF 40.0 TO 44.9 IN ADULT, UNSPECIFIED OBESITY TYPE (H): ICD-10-CM

## 2023-10-03 DIAGNOSIS — R10.13 EPIGASTRIC PAIN: ICD-10-CM

## 2023-10-03 PROCEDURE — 99214 OFFICE O/P EST MOD 30 MIN: CPT | Performed by: INTERNAL MEDICINE

## 2023-10-03 NOTE — PROGRESS NOTES
[address] [address]  Phone: [phone]  Fax: [phone]  Primary Provider: Anna Martinez  Pre-op Performing Provider: MILLA CULP    {Provider  Link to PREOP SmartSet  Use this to apply standard patient instructions to AVS; includes medication directions, common orders, guidelines for anemia, warfarin, additional testing   :370992}  PREOPERATIVE EVALUATION:  Today's date: 10/3/2023    Makeda is a 23 year old female who presents for a preoperative evaluation.      10/3/2023     8:49 AM   Additional Questions   Roomed by Judi       Surgical Information:  Surgery/Procedure: CHOLECYSTECTOMY, ROBOT-ASSISTED, LAPAROSCOPIC, WITHOUT CHOLANGIOGRAM   Surgery Location: [facility]  Surgeon: Dr. Bean  Surgery Date: 10/13/23  Time of Surgery: 9:55 AM  Where patient plans to recover: At home with family  Fax number for surgical facility: Note does not need to be faxed, will be available electronically in Epic.    {2021 Provider Charting Preference for Preop :382233}    Subjective       HPI related to upcoming procedure: ***        10/3/2023     8:46 AM   Preop Questions   1. Have you ever had a heart attack or stroke? No   2. Have you ever had surgery on your heart or blood vessels, such as a stent placement, a coronary artery bypass, or surgery on an artery in your head, neck, heart, or legs? No   3. Do you have chest pain with activity? No   4. Do you have a history of  heart failure? No   5. Do you currently have a cold, bronchitis or symptoms of other infection? No   6. Do you have a cough, shortness of breath, or wheezing? No   7. Do you or anyone in your family have previous history of blood clots? YES - ***   8. Do you or does anyone in your family have a serious bleeding problem such as prolonged bleeding following surgeries or cuts? No   9. Have you ever had problems with anemia or been told to take iron pills? No   10. Have you  had any abnormal blood loss such as black, tarry or bloody stools, or abnormal vaginal bleeding? No   11. Have you ever had a blood transfusion? No   12. Are you willing to have a blood transfusion if it is medically needed before, during, or after your surgery? Yes   13. Have you or any of your relatives ever had problems with anesthesia? No   14. Do you have sleep apnea, excessive snoring or daytime drowsiness? No   15. Do you have any artifical heart valves or other implanted medical devices like a pacemaker, defibrillator, or continuous glucose monitor? No   16. Do you have artificial joints? No   17. Are you allergic to latex? No   18. Is there any chance that you may be pregnant? No       Health Care Directive:  Patient does not have a Health Care Directive or Living Will: {ADVANCE_DIRECTIVE_STATUS:630637}    Preoperative Review of :  {Mnpmpreport:468649}  {Review MNPMP for all patients per [employer] MNPMP Profile:240933}    {Chronic problem details (Optional) :002511}    Review of Systems  {ROS Preop Choices:862867}    Patient Active Problem List    Diagnosis Date Noted    Gallstones 09/28/2023     Priority: Medium    Seasonal allergic rhinitis 05/30/2023     Priority: Medium    Eczema 05/30/2023     Priority: Medium    Hirsutism 05/30/2023     Priority: Medium    Obesity 05/30/2023     Priority: Medium    Fatigue 05/30/2023     Priority: Medium    Controlled substance agreement signed-CSA and urine tox done for Adderall XR 30 mg and 10 mg 7-11-22 07/11/2022     Priority: Medium    Family history of hypothyroidism 10/11/2018     Priority: Medium    Vitamin D deficiency 10/08/2015     Priority: Medium    ADD (attention deficit disorder) 08/13/2015     Priority: Medium    Keratosis Pilaris      Priority: Medium     Created by [facility] [facility] Annotation: Jul 2 2009  5:25PM - Anna Martinez: Selena Awan      Priority: Medium     Created by [employer] [facility] Annotation: Jul 2 2009  5:24PM  "- Anna Martinez: Normal   echo          Past Medical History:   Diagnosis Date    Eczema     Elevated blood pressure reading without diagnosis of hypertension     Fatigue     Hirsutism     Obesity     Seasonal allergic rhinitis      Past Surgical History:   Procedure Laterality Date    HC OR DEVICE CONTRACEPTIVE MIRENA IUD 5YR DURATION  02/2021    MOUTH SURGERY  08/01/2019    [facility] teeth removed    TONSILLECTOMY Bilateral 01/03/2016     Current Outpatient Medications   Medication Sig Dispense Refill    amphetamine-dextroamphetamine (ADDERALL XR) 10 MG 24 hr capsule Take 1 capsule (10 mg) by mouth daily 90 capsule 0    amphetamine-dextroamphetamine (ADDERALL XR) 30 MG 24 hr capsule Take 1 capsule (30 mg) by mouth daily 90 capsule 0    metFORMIN (GLUCOPHAGE XR) 500 MG 24 hr tablet Take 1 tablet (500 mg) by mouth 2 times daily (with meals) 180 tablet 3    nitroFURantoin macrocrystal (MACRODANTIN) 100 MG capsule Take 100 mg by mouth 4 times daily      omeprazole (PRILOSEC) 20 MG DR capsule Take 20 mg by mouth      Semaglutide-Weight Management (WEGOVY) 1 MG/0.5ML pen Inject 1 mg Subcutaneous once a week 2 mL 0    Semaglutide-Weight Management (WEGOVY) 1.7 MG/0.75ML pen Inject 1.7 mg Subcutaneous once a week for 28 days 3 mL 0    [START ON 10/16/2023] Semaglutide-Weight Management (WEGOVY) 2.4 MG/0.75ML pen Inject 2.4 mg Subcutaneous once a week 9 mL 3    vitamin B-12 (CYANOCOBALAMIN) 2500 MCG sublingual tablet Take 1 tablet (2,500 mcg) by mouth daily 90 tablet 3       No Known Allergies     Social History     Tobacco Use    Smoking status: Never    Smokeless tobacco: Never   Substance Use Topics    Alcohol use: Not on file     {FAMILY HISTORY (Optional):883266596}  History   Drug Use Not on file         Objective     /62 (BP Location: Right arm, Patient Position: Sitting, Cuff Size: Adult Regular)   Pulse 104   Temp 97.8  F (36.6  C) (Oral)   Resp 24   Ht 1.702 m (5' 7\")   Wt 120.9 kg (266 lb 8 oz)   " LMP 2023 (Approximate)   SpO2 99%   BMI 41.74 kg/m      Physical Exam  {EXAM Preop Choices:355353}    Recent Labs   Lab Test 23  0502   HGB 15.4         POTASSIUM 3.3*   CR 0.88        Diagnostics:  {LABS:712311}   {EK}    Revised Cardiac Risk Index (RCRI):  The patient has the following serious cardiovascular risks for perioperative complications:  {PREOP REVISED CARDIAC RISK INDEX (RCRI) :218846}     RCRI Interpretation: {REVISED CARDIAC RISK INTERPRETATION :752434}         Signed Electronically by: Timi Barrow MD  Copy of this evaluation report is provided to requesting physician.    {Provider Resources  Preop [facility] Preop Guidelines  Revised Cardiac Risk Index :453715}

## 2023-10-03 NOTE — PROGRESS NOTES
[facility]  [facility] Specialists    Care coordination - Customized care -  Comprehensive and complex medical care            Date of Service: 10/3/2023  Primary Provider: Anna Martinez    Patient Care Team:  Anna Martinez MD as PCP - General  Anna Martinez MD as Assigned PCP  Chari Camacho, PhD LP as Assigned Behavioral Health Provider  Sidney Demarco MD (Emergency Medicine)  Demetrius Bean MD as MD (Surgery)            Patient's Pharmacy:    [id number] [place], [place] - [address] [facility]  [address] [facility] [place] [address]  Phone: [phone] Fax: [phone]    [facility] [address] [facility]   [facility] [address]  Phone: [phone] Fax: [phone]    [facility] [address] [place] [facility] [address]  [address] [place] [address]  Phone: [phone] Fax: [phone]    [facility] [place], [place] - [address]  [address] [facility] [address]  Phone: [phone] Fax: [phone]     Patient's Contacts:  Name Home Phone Work Phone Mobile Phone Relationship Lgl LAUREN Cleary [phone]   Mother    SCOOTER VELAZCO [phone] Father        Patient's Insurance:    Payor: BCBS / Plan: BCBS OUT OF STATE / Product Type: Indemnity /           Preoperative examination    Makeda Velazco is a 23 year old female (2000) who I am asked to see by her surgeon regarding her fitness for upcoming surgery.      Chief Complaint   Patient presents with    Pre-Op Exam     10/13/23 Gallbladder removal Dr. Bean        Subjective:     Patient comes in today for preoperative evaluation prior to her planned cholecystectomy.    She has been having issues with epigastric pain.  It can come on randomly.  It can come on with eating but can be delayed at an hour or 2.    She has met with the surgeon and the plan is to proceed with cholecystectomy.   She has been having increasing problems over the last month or so.  She is on Wegovy.  She has been instructed what to do with this medication prior to the surgery.    She was recently seen for urinary tract symptoms and given Macrobid.  The symptoms have subsequently resolved.    She had a mild low potassium in the ER but this is likely due to stress.  We talked about dietary options related to this.    ______________________________________________________________________         10/3/2023     8:46 AM   Pre-op Questionnaire   1. Have you ever had a heart attack or stroke? No   2. Have you ever had surgery on your heart or blood vessels, such as a stent placement, a coronary artery bypass, or surgery on an artery in your head, neck, heart, or legs? No   3. Do you have chest pain with activity? No   4. Do you have a history of  heart failure? No   5. Do you currently have a cold, bronchitis or symptoms of other infection? No   6. Do you have a cough, shortness of breath, or wheezing? No   7. Do you or anyone in your family have previous history of blood clots? YES -patient mentions father having a blood clot in the right side of his neck.   8. Do you or does anyone in your family have a serious bleeding problem such as prolonged bleeding following surgeries or cuts? No   9. Have you ever had problems with anemia or been told to take iron pills? No   10. Have you had any abnormal blood loss such as black, tarry or bloody stools, or abnormal vaginal bleeding? No   11. Have you ever had a blood transfusion? No   12. Are you willing to have a blood transfusion if it is medically needed before, during, or after your surgery? Yes   13. Have you or any of your relatives ever had problems with anesthesia? No   14. Do you have sleep apnea, excessive snoring or daytime drowsiness? No   15. Do you have any artifical heart valves or other implanted medical devices like a pacemaker, defibrillator, or continuous glucose monitor?  No   16. Do you have artificial joints? No   17. Are you allergic to latex? No   18. Is there any chance that you may be pregnant? No     ______________________________________________________________________     We reviewed her other issues noted in the assessment but not specifically addressed in the HPI above.   ______________________________________________________________________     Patient Active Problem List   Diagnosis    Keratosis Pilaris    Murmurs    ADD (attention deficit disorder)    Vitamin D deficiency    Family history of hypothyroidism    Controlled substance agreement signed-CSA and urine tox done for Adderall XR 30 mg and 10 mg 7-11-22    Seasonal allergic rhinitis    Eczema    Hirsutism    Obesity    Fatigue    Gallstones       Past Surgical History:   Procedure Laterality Date    HC OR DEVICE CONTRACEPTIVE MIRENA IUD 5YR DURATION  02/2021    MOUTH SURGERY  08/01/2019    [place] teeth removed    ORIF METACARPAL FRACTURE Left     Age 19    TONSILLECTOMY Bilateral 01/03/2016      Social History     Occupational History    Not on file   Tobacco Use    Smoking status: Never    Smokeless tobacco: Never   Substance and Sexual Activity    Alcohol use: Not on file    Drug use: Not on file    Sexual activity: Not on file     Social History     Social History Narrative    Twin sister Shruthi  2 younger sisters and Dad and Mom at home.  2 dogs Going to [employer] to [employer]. Psychology major      Family History   Problem Relation Age of Onset    Obesity Mother     Hypothyroidism Mother     Obesity Father     Diabetes Father     Hypertension Father     No Known Problems Sister     No Known Problems Sister     Hypertension Paternal Grandmother       Family history is noncontributory otherwise.    Allergies: Patient has no known allergies.     Current medications:  Current Outpatient Medications   Medication Instructions    amphetamine-dextroamphetamine (ADDERALL XR) 10 MG 24 hr capsule 10 mg,  "Oral, DAILY    amphetamine-dextroamphetamine (ADDERALL XR) 30 MG 24 hr capsule 30 mg, Oral, DAILY    metFORMIN (GLUCOPHAGE XR) 500 mg, Oral, 2 TIMES DAILY WITH MEALS    nitroFURantoin macrocrystal (MACRODANTIN) 100 mg, Oral, 4 TIMES DAILY    omeprazole (PRILOSEC) 20 mg, Oral    vitamin B-12 (CYANOCOBALAMIN) 2,500 mcg, Oral, DAILY    Wegovy 1 mg, Subcutaneous, WEEKLY    Wegovy 1.7 mg, Subcutaneous, WEEKLY    [START ON 10/16/2023] Wegovy 2.4 mg, Subcutaneous, WEEKLY        Objective:     Wt Readings from Last 3 Encounters:   10/03/23 120.9 kg (266 lb 8 oz)   09/28/23 103.4 kg (228 lb)   09/22/23 104.3 kg (230 lb)     BP Readings from Last 3 Encounters:   10/03/23 120/62   09/22/23 136/77   05/30/23 138/86     /62 (BP Location: Right arm, Patient Position: Sitting, Cuff Size: Adult Regular)   Pulse 104   Temp 97.8  F (36.6  C) (Oral)   Resp 24   Ht 1.702 m (5' 7\")   Wt 120.9 kg (266 lb 8 oz)   LMP 09/18/2023 (Approximate)   SpO2 99%   BMI 41.74 kg/m     Patient is in no acute distress.  Mood good.  Insight good.  Eyes nonicteric.  Pupils equal.  Neck is supple.  No cervical adenopathy.  No thyromegaly.  Heart is regular rate and rhythm.  Lungs clear to auscultation bilaterally.  Respiratory effort is good.  Abdomen is soft, nontender, no hepatosplenomegaly.  Extremities no edema.     Diagnostics:     Admission on 09/22/2023, Discharged on 09/22/2023   Component Date Value Ref Range Status    Sodium 09/22/2023 139  136 - 145 mmol/L Final    Potassium 09/22/2023 3.3 (L)  3.4 - 5.3 mmol/L Final    Chloride 09/22/2023 102  98 - 107 mmol/L Final    Carbon Dioxide (CO2) 09/22/2023 23  22 - 29 mmol/L Final    Anion Gap 09/22/2023 14  7 - 15 mmol/L Final    Urea Nitrogen 09/22/2023 12.4  6.0 - 20.0 mg/dL Final    Creatinine 09/22/2023 0.88  0.51 - 0.95 mg/dL Final    Calcium 09/22/2023 9.5  8.6 - 10.0 mg/dL Final    Glucose 09/22/2023 123 (H)  70 - 99 mg/dL Final    Alkaline Phosphatase 09/22/2023 84  35 - 104 " U/L Final    AST 09/22/2023 34  0 - 45 U/L Final    Reference intervals for this test were updated on 6/12/2023 to more accurately reflect our healthy population. There may be differences in the flagging of prior results with similar values performed with this method. Interpretation of those prior results can be made in the context of the updated reference intervals.    ALT 09/22/2023 24  0 - 50 U/L Final    Reference intervals for this test were updated on 6/12/2023 to more accurately reflect our healthy population. There may be differences in the flagging of prior results with similar values performed with this method. Interpretation of those prior results can be made in the context of the updated reference intervals.      Protein Total 09/22/2023 7.3  6.4 - 8.3 g/dL Final    Albumin 09/22/2023 4.5  3.5 - 5.2 g/dL Final    Bilirubin Total 09/22/2023 0.6  <=1.2 mg/dL Final    GFR Estimate 09/22/2023 >90  >60 mL/min/1.73m2 Final    Lipase 09/22/2023 43  13 - 60 U/L Final    hCG Serum Qualitative 09/22/2023 Negative  Negative Final    Hold Specimen 09/22/2023 JIC   Final    Hold Specimen 09/22/2023 JIC   Final    Hold Specimen 09/22/2023 JI   Final    WBC Count 09/22/2023 15.3 (H)  4.0 - 11.0 10e3/uL Final    RBC Count 09/22/2023 5.54 (H)  3.80 - 5.20 10e6/uL Final    Hemoglobin 09/22/2023 15.4  11.7 - 15.7 g/dL Final    Hematocrit 09/22/2023 45.3  35.0 - 47.0 % Final    MCV 09/22/2023 82  78 - 100 fL Final    MCH 09/22/2023 27.8  26.5 - 33.0 pg Final    MCHC 09/22/2023 34.0  31.5 - 36.5 g/dL Final    RDW 09/22/2023 12.2  10.0 - 15.0 % Final    Platelet Count 09/22/2023 379  150 - 450 10e3/uL Final    % Neutrophils 09/22/2023 70  % Final    % Lymphocytes 09/22/2023 18  % Final    % Monocytes 09/22/2023 5  % Final    % Eosinophils 09/22/2023 6  % Final    % Basophils 09/22/2023 0  % Final    % Immature Granulocytes 09/22/2023 1  % Final    NRBCs per 100 WBC 09/22/2023 0  <1 /100 Final    Absolute Neutrophils  09/22/2023 10.8 (H)  1.6 - 8.3 10e3/uL Final    Absolute Lymphocytes 09/22/2023 2.8  0.8 - 5.3 10e3/uL Final    Absolute Monocytes 09/22/2023 0.7  0.0 - 1.3 10e3/uL Final    Absolute Eosinophils 09/22/2023 1.0 (H)  0.0 - 0.7 10e3/uL Final    Absolute Basophils 09/22/2023 0.1  0.0 - 0.2 10e3/uL Final    Absolute Immature Granulocytes 09/22/2023 0.1  <=0.4 10e3/uL Final    Absolute NRBCs 09/22/2023 0.0  10e3/uL Final      Component      Latest Ref Rng 9/22/2023  5:02 AM   HCG Qualitative Serum      Negative  Negative        Narrative & Impression   EXAM: US ABDOMEN LIMITED  LOCATION: [facility]  DATE: 9/22/2023     INDICATION: abdominal pain  COMPARISON: None.  TECHNIQUE: Limited abdominal ultrasound.     FINDINGS:     GALLBLADDER: Gallstones in an otherwise normal gallbladder. No wall thickening, or pericholecystic fluid. Negative sonographic Muhammad's sign.     BILE DUCTS: No biliary dilatation. The common duct measures 4 mm.     LIVER: Normal parenchyma with smooth contour. No focal mass.     RIGHT KIDNEY: No hydronephrosis.     PANCREAS: The visualized portions are normal.     No ascites.                                                                      IMPRESSION:  1.  Cholelithiasis.        Impression:     1. Preoperative examination    2. Gallstones    3. Epigastric pain    4. Attention deficit disorder, unspecified hyperactivity presence    5. Class 3 severe obesity without serious comorbidity with body mass index (BMI) of 40.0 to 44.9 in adult, unspecified obesity type (H)    6. Recent urinary tract infection        Plan:     Okay to proceed with surgery as planned.  No need to take any medications on the am of the surgery.  Urinary tract infection (UTI) symptoms resolved after MACROBID.         Timi Barrow MD  General Internal Medicine  [facility]    Return in about 4 months (around 1/25/2024) for follow up with your primary care provider.      Future Appointments   Date Time Provider Department Center   1/12/2024  1:00 PM Frida Bergman MD [facility]   1/25/2024  2:20 PM Anna Martinez MD [facility]

## 2023-10-03 NOTE — PATIENT INSTRUCTIONS
Future Appointments   Date Time Provider Department Center   1/12/2024  1:00 PM Frida Bergman MD [facility]   1/25/2024  2:20 PM Anna Martinez MD [facility]

## 2023-10-12 ENCOUNTER — ANESTHESIA EVENT (OUTPATIENT)
Dept: SURGERY | Facility: AMBULATORY SURGERY CENTER | Age: 23
End: 2023-10-12
Payer: COMMERCIAL

## 2023-10-13 ENCOUNTER — HOSPITAL ENCOUNTER (OUTPATIENT)
Facility: AMBULATORY SURGERY CENTER | Age: 23
Discharge: HOME | End: 2023-10-13
Attending: SURGERY
Payer: COMMERCIAL

## 2023-10-13 ENCOUNTER — ANESTHESIA (OUTPATIENT)
Dept: SURGERY | Facility: AMBULATORY SURGERY CENTER | Age: 23
End: 2023-10-13
Payer: COMMERCIAL

## 2023-10-13 VITALS
TEMPERATURE: 98 F | OXYGEN SATURATION: 100 % | DIASTOLIC BLOOD PRESSURE: 64 MMHG | BODY MASS INDEX: 41.75 KG/M2 | HEIGHT: 67 IN | RESPIRATION RATE: 14 BRPM | HEART RATE: 63 BPM | SYSTOLIC BLOOD PRESSURE: 126 MMHG | WEIGHT: 266 LBS

## 2023-10-13 DIAGNOSIS — K80.20 GALLSTONES: Primary | ICD-10-CM

## 2023-10-13 LAB
GLUCOSE BLDC GLUCOMTR-MCNC: 74 MG/DL (ref 70–99)
HCG UR QL: NEGATIVE
INTERNAL QC OK POCT: NORMAL
POCT KIT EXPIRATION DATE: NORMAL
POCT KIT LOT NUMBER: NORMAL

## 2023-10-13 PROCEDURE — 88304 TISSUE EXAM BY PATHOLOGIST: CPT | Mod: TC | Performed by: SURGERY

## 2023-10-13 PROCEDURE — 47562 LAPAROSCOPIC CHOLECYSTECTOMY: CPT | Performed by: SURGERY

## 2023-10-13 PROCEDURE — 81025 URINE PREGNANCY TEST: CPT | Performed by: PATHOLOGY

## 2023-10-13 PROCEDURE — 88304 TISSUE EXAM BY PATHOLOGIST: CPT | Mod: 26 | Performed by: PATHOLOGY

## 2023-10-13 PROCEDURE — 82962 GLUCOSE BLOOD TEST: CPT | Performed by: PATHOLOGY

## 2023-10-13 RX ORDER — SODIUM CHLORIDE, SODIUM LACTATE, POTASSIUM CHLORIDE, CALCIUM CHLORIDE 600; 310; 30; 20 MG/100ML; MG/100ML; MG/100ML; MG/100ML
INJECTION, SOLUTION INTRAVENOUS CONTINUOUS
Status: DISCONTINUED | OUTPATIENT
Start: 2023-10-13 | End: 2023-10-13 | Stop reason: HOSPADM

## 2023-10-13 RX ORDER — ONDANSETRON 2 MG/ML
4 INJECTION INTRAMUSCULAR; INTRAVENOUS EVERY 30 MIN PRN
Status: DISCONTINUED | OUTPATIENT
Start: 2023-10-13 | End: 2023-10-14 | Stop reason: HOSPADM

## 2023-10-13 RX ORDER — ONDANSETRON 4 MG/1
4 TABLET, ORALLY DISINTEGRATING ORAL EVERY 30 MIN PRN
Status: DISCONTINUED | OUTPATIENT
Start: 2023-10-13 | End: 2023-10-13 | Stop reason: HOSPADM

## 2023-10-13 RX ORDER — PROPOFOL 10 MG/ML
INJECTION, EMULSION INTRAVENOUS PRN
Status: DISCONTINUED | OUTPATIENT
Start: 2023-10-13 | End: 2023-10-13

## 2023-10-13 RX ORDER — HYDROCODONE BITARTRATE AND ACETAMINOPHEN 5; 325 MG/1; MG/1
1-2 TABLET ORAL EVERY 4 HOURS PRN
Qty: 15 TABLET | Refills: 0 | Status: SHIPPED | OUTPATIENT
Start: 2023-10-13 | End: 2024-01-02

## 2023-10-13 RX ORDER — KETOROLAC TROMETHAMINE 30 MG/ML
INJECTION, SOLUTION INTRAMUSCULAR; INTRAVENOUS PRN
Status: DISCONTINUED | OUTPATIENT
Start: 2023-10-13 | End: 2023-10-13

## 2023-10-13 RX ORDER — LABETALOL HYDROCHLORIDE 5 MG/ML
10 INJECTION, SOLUTION INTRAVENOUS
Status: DISCONTINUED | OUTPATIENT
Start: 2023-10-13 | End: 2023-10-13 | Stop reason: HOSPADM

## 2023-10-13 RX ORDER — CEFAZOLIN SODIUM 2 G/50ML
2 SOLUTION INTRAVENOUS SEE ADMIN INSTRUCTIONS
Status: DISCONTINUED | OUTPATIENT
Start: 2023-10-13 | End: 2023-10-13 | Stop reason: HOSPADM

## 2023-10-13 RX ORDER — BUPIVACAINE HYDROCHLORIDE 5 MG/ML
INJECTION, SOLUTION EPIDURAL; INTRACAUDAL PRN
Status: DISCONTINUED | OUTPATIENT
Start: 2023-10-13 | End: 2023-10-13 | Stop reason: HOSPADM

## 2023-10-13 RX ORDER — ACETAMINOPHEN 325 MG/1
975 TABLET ORAL ONCE
Status: COMPLETED | OUTPATIENT
Start: 2023-10-13 | End: 2023-10-13

## 2023-10-13 RX ORDER — HYDROMORPHONE HYDROCHLORIDE 1 MG/ML
0.2 INJECTION, SOLUTION INTRAMUSCULAR; INTRAVENOUS; SUBCUTANEOUS EVERY 5 MIN PRN
Status: DISCONTINUED | OUTPATIENT
Start: 2023-10-13 | End: 2023-10-13 | Stop reason: HOSPADM

## 2023-10-13 RX ORDER — ONDANSETRON 4 MG/1
4 TABLET, ORALLY DISINTEGRATING ORAL EVERY 30 MIN PRN
Status: DISCONTINUED | OUTPATIENT
Start: 2023-10-13 | End: 2023-10-14 | Stop reason: HOSPADM

## 2023-10-13 RX ORDER — FENTANYL CITRATE 50 UG/ML
INJECTION, SOLUTION INTRAMUSCULAR; INTRAVENOUS PRN
Status: DISCONTINUED | OUTPATIENT
Start: 2023-10-13 | End: 2023-10-13

## 2023-10-13 RX ORDER — FENTANYL CITRATE 50 UG/ML
25 INJECTION, SOLUTION INTRAMUSCULAR; INTRAVENOUS EVERY 5 MIN PRN
Status: DISCONTINUED | OUTPATIENT
Start: 2023-10-13 | End: 2023-10-13 | Stop reason: HOSPADM

## 2023-10-13 RX ORDER — PROPOFOL 10 MG/ML
INJECTION, EMULSION INTRAVENOUS CONTINUOUS PRN
Status: DISCONTINUED | OUTPATIENT
Start: 2023-10-13 | End: 2023-10-13

## 2023-10-13 RX ORDER — ONDANSETRON 2 MG/ML
4 INJECTION INTRAMUSCULAR; INTRAVENOUS EVERY 30 MIN PRN
Status: DISCONTINUED | OUTPATIENT
Start: 2023-10-13 | End: 2023-10-13 | Stop reason: HOSPADM

## 2023-10-13 RX ORDER — LIDOCAINE 40 MG/G
CREAM TOPICAL
Status: DISCONTINUED | OUTPATIENT
Start: 2023-10-13 | End: 2023-10-13 | Stop reason: HOSPADM

## 2023-10-13 RX ORDER — FENTANYL CITRATE 50 UG/ML
50 INJECTION, SOLUTION INTRAMUSCULAR; INTRAVENOUS EVERY 5 MIN PRN
Status: DISCONTINUED | OUTPATIENT
Start: 2023-10-13 | End: 2023-10-13 | Stop reason: HOSPADM

## 2023-10-13 RX ORDER — ONDANSETRON 2 MG/ML
INJECTION INTRAMUSCULAR; INTRAVENOUS PRN
Status: DISCONTINUED | OUTPATIENT
Start: 2023-10-13 | End: 2023-10-13

## 2023-10-13 RX ORDER — DEXAMETHASONE SODIUM PHOSPHATE 4 MG/ML
INJECTION, SOLUTION INTRA-ARTICULAR; INTRALESIONAL; INTRAMUSCULAR; INTRAVENOUS; SOFT TISSUE PRN
Status: DISCONTINUED | OUTPATIENT
Start: 2023-10-13 | End: 2023-10-13

## 2023-10-13 RX ORDER — LIDOCAINE HYDROCHLORIDE 20 MG/ML
INJECTION, SOLUTION INFILTRATION; PERINEURAL PRN
Status: DISCONTINUED | OUTPATIENT
Start: 2023-10-13 | End: 2023-10-13

## 2023-10-13 RX ORDER — CEFAZOLIN SODIUM 2 G/50ML
2 SOLUTION INTRAVENOUS
Status: COMPLETED | OUTPATIENT
Start: 2023-10-13 | End: 2023-10-13

## 2023-10-13 RX ORDER — SCOLOPAMINE TRANSDERMAL SYSTEM 1 MG/1
1 PATCH, EXTENDED RELEASE TRANSDERMAL ONCE
Status: DISCONTINUED | OUTPATIENT
Start: 2023-10-13 | End: 2023-10-14 | Stop reason: HOSPADM

## 2023-10-13 RX ORDER — DEXMEDETOMIDINE HYDROCHLORIDE 4 UG/ML
INJECTION, SOLUTION INTRAVENOUS PRN
Status: DISCONTINUED | OUTPATIENT
Start: 2023-10-13 | End: 2023-10-13

## 2023-10-13 RX ORDER — HYDROMORPHONE HYDROCHLORIDE 1 MG/ML
0.4 INJECTION, SOLUTION INTRAMUSCULAR; INTRAVENOUS; SUBCUTANEOUS EVERY 5 MIN PRN
Status: DISCONTINUED | OUTPATIENT
Start: 2023-10-13 | End: 2023-10-13 | Stop reason: HOSPADM

## 2023-10-13 RX ORDER — OXYCODONE HYDROCHLORIDE 5 MG/1
10 TABLET ORAL
Status: DISCONTINUED | OUTPATIENT
Start: 2023-10-13 | End: 2023-10-14 | Stop reason: HOSPADM

## 2023-10-13 RX ORDER — HYDRALAZINE HYDROCHLORIDE 20 MG/ML
2.5-5 INJECTION INTRAMUSCULAR; INTRAVENOUS EVERY 10 MIN PRN
Status: DISCONTINUED | OUTPATIENT
Start: 2023-10-13 | End: 2023-10-13 | Stop reason: HOSPADM

## 2023-10-13 RX ORDER — OXYCODONE HYDROCHLORIDE 5 MG/1
5 TABLET ORAL
Status: COMPLETED | OUTPATIENT
Start: 2023-10-13 | End: 2023-10-13

## 2023-10-13 RX ORDER — INDOCYANINE GREEN AND WATER 25 MG
2.5 KIT INJECTION ONCE
Status: COMPLETED | OUTPATIENT
Start: 2023-10-13 | End: 2023-10-13

## 2023-10-13 RX ADMIN — ACETAMINOPHEN 975 MG: 325 TABLET ORAL at 08:28

## 2023-10-13 RX ADMIN — CEFAZOLIN SODIUM 2 G: 2 SOLUTION INTRAVENOUS at 10:35

## 2023-10-13 RX ADMIN — ONDANSETRON 4 MG: 2 INJECTION INTRAMUSCULAR; INTRAVENOUS at 10:40

## 2023-10-13 RX ADMIN — DEXMEDETOMIDINE HYDROCHLORIDE 8 MCG: 4 INJECTION, SOLUTION INTRAVENOUS at 10:39

## 2023-10-13 RX ADMIN — PROPOFOL 200 MCG/KG/MIN: 10 INJECTION, EMULSION INTRAVENOUS at 10:31

## 2023-10-13 RX ADMIN — Medication 0.5 MG: at 11:02

## 2023-10-13 RX ADMIN — DEXAMETHASONE SODIUM PHOSPHATE 4 MG: 4 INJECTION, SOLUTION INTRA-ARTICULAR; INTRALESIONAL; INTRAMUSCULAR; INTRAVENOUS; SOFT TISSUE at 10:31

## 2023-10-13 RX ADMIN — SCOLOPAMINE TRANSDERMAL SYSTEM 1 PATCH: 1 PATCH, EXTENDED RELEASE TRANSDERMAL at 08:37

## 2023-10-13 RX ADMIN — FENTANYL CITRATE 25 MCG: 50 INJECTION, SOLUTION INTRAMUSCULAR; INTRAVENOUS at 11:47

## 2023-10-13 RX ADMIN — KETOROLAC TROMETHAMINE 30 MG: 30 INJECTION, SOLUTION INTRAMUSCULAR; INTRAVENOUS at 11:24

## 2023-10-13 RX ADMIN — Medication 30 MG: at 10:46

## 2023-10-13 RX ADMIN — SODIUM CHLORIDE, SODIUM LACTATE, POTASSIUM CHLORIDE, CALCIUM CHLORIDE: 600; 310; 30; 20 INJECTION, SOLUTION INTRAVENOUS at 08:29

## 2023-10-13 RX ADMIN — PROPOFOL 200 MG: 10 INJECTION, EMULSION INTRAVENOUS at 10:31

## 2023-10-13 RX ADMIN — DEXMEDETOMIDINE HYDROCHLORIDE 8 MCG: 4 INJECTION, SOLUTION INTRAVENOUS at 10:43

## 2023-10-13 RX ADMIN — LIDOCAINE HYDROCHLORIDE 100 MG: 20 INJECTION, SOLUTION INFILTRATION; PERINEURAL at 10:31

## 2023-10-13 RX ADMIN — FENTANYL CITRATE 100 MCG: 50 INJECTION, SOLUTION INTRAMUSCULAR; INTRAVENOUS at 10:31

## 2023-10-13 RX ADMIN — SODIUM CHLORIDE, SODIUM LACTATE, POTASSIUM CHLORIDE, CALCIUM CHLORIDE: 600; 310; 30; 20 INJECTION, SOLUTION INTRAVENOUS at 10:26

## 2023-10-13 RX ADMIN — FENTANYL CITRATE 25 MCG: 50 INJECTION, SOLUTION INTRAMUSCULAR; INTRAVENOUS at 11:54

## 2023-10-13 RX ADMIN — OXYCODONE HYDROCHLORIDE 5 MG: 5 TABLET ORAL at 11:45

## 2023-10-13 RX ADMIN — Medication 10 MG: at 10:31

## 2023-10-13 RX ADMIN — INDOCYANINE GREEN AND WATER 2.5 MG: KIT at 08:29

## 2023-10-13 NOTE — OP NOTE
Operative report    Preop diagnosis symptomatic cholelithiasis  Postop diagnosis same    Procedure: Laparoscopic cholecystectomy robot-assisted    Surgeon DANELLE Bean    Anesthesia General endotracheal & local infiltration Marcaine.    Indications for procedure: Patient presents with symptomatic cholelithiasis, informed consent was obtained.    Operative findings: Normal cystic duct cystic artery anatomy confirmed by firefly    Operative procedure:   Patient brought to the operating room put under general anesthesia, abdomen widely prepped and draped in usual sterile fashion.  Infraumbilical skin incision was made , fascia secured with O vicryl, open technique used per routine and technique to place Mendoza port.  Abdomen Insufflated.  5 Mm Port Placed Left Subcostal per Routine. Two 8 Mm Ports Were Placed Left and Right of Mendoza Port per  routine and technique with Marcaine placed under direct vision into preperitoneal space laterally.    Robot was docked at this point and attention turned to the console where the gallbladder was retracted with the accessory port grasper; the gallbladder was pulled out laterally.  The cystic duct and cystic artery were identified with the help of firefly technique.  Dissection was carried out to isolate the cystic duct as well as the cystic artery.  Cystic artery hemostasis was obtained using the fenestrated bipolar.  The cystic duct was then clipped proximally distally using Hem-o-padmaja clips.  The cystic duct then transected using cutting cautery and the gallbladder taken off the liver bed use electrocautery.  Hemostasis at liver bed was complete.  At this point the robot was undocked and the gallbladder delivered through the infraumbilical port site using my standard routine and technique.  The abdomen is deflated our ports removed, the fascial defect closed with the 0 Vicryl, skin was subcuticular.    Estimated blood loss minimal  Pathology gallbladder for permanent    Patient was  taken recovery room where she was without difficulty or apparent complication.

## 2023-10-13 NOTE — ANESTHESIA POSTPROCEDURE EVALUATION
Patient: Makeda Sanches    Procedure: Procedure(s):  CHOLECYSTECTOMY, ROBOT-ASSISTED, LAPAROSCOPIC, WITHOUT CHOLANGIOGRAM       Anesthesia Type:  General    Note:  Disposition: Outpatient   Postop Pain Control: Uneventful            Sign Out: Well controlled pain   PONV: No   Neuro/Psych: Uneventful            Sign Out: Acceptable/Baseline neuro status   Airway/Respiratory: Uneventful            Sign Out: Acceptable/Baseline resp. status   CV/Hemodynamics: Uneventful            Sign Out: Acceptable CV status; No obvious hypovolemia; No obvious fluid overload   Other NRE: NONE   DID A NON-ROUTINE EVENT OCCUR? No           Last vitals:  Vitals Value Taken Time   /69 10/13/23 1156   Temp 36.7  C (98  F) 10/13/23 1156   Pulse 76 10/13/23 1156   Resp 11 10/13/23 1156   SpO2 98 % 10/13/23 1156   Vitals shown include unfiled device data.    Electronically Signed By: NED BEAL MD  October 13, 2023  11:58 AM

## 2023-10-13 NOTE — ANESTHESIA PREPROCEDURE EVALUATION
"Anesthesia Pre-Procedure Evaluation    Patient: Makeda Sanches   MRN: [id number] : 2000        Procedure : Procedure(s):  CHOLECYSTECTOMY, ROBOT-ASSISTED, LAPAROSCOPIC, WITHOUT CHOLANGIOGRAM          Past Medical History:   Diagnosis Date    Eczema     Elevated blood pressure reading without diagnosis of hypertension     Fatigue     Hirsutism     Obesity     Seasonal allergic rhinitis       Past Surgical History:   Procedure Laterality Date    HC OR DEVICE CONTRACEPTIVE MIRENA IUD 5YR DURATION  2021    MOUTH SURGERY  2019    [place] teeth removed    ORIF METACARPAL FRACTURE Left     Age 19    TONSILLECTOMY Bilateral 2016      No Known Allergies   Social History     Tobacco Use    Smoking status: Never    Smokeless tobacco: Never   Substance Use Topics    Alcohol use: Not on file      Wt Readings from Last 1 Encounters:   10/13/23 120.7 kg (266 lb)        Anesthesia Evaluation            ROS/MED HX  ENT/Pulmonary:       Neurologic:       Cardiovascular:       METS/Exercise Tolerance:     Hematologic:       Musculoskeletal:       GI/Hepatic:       Renal/Genitourinary:       Endo:     (+)               Obesity,       Psychiatric/Substance Use:       Infectious Disease:       Malignancy:       Other:            Physical Exam    Airway        Mallampati: II       Respiratory Devices and Support         Dental           Cardiovascular          Rhythm and rate: regular and normal     Pulmonary                   OUTSIDE LABS:  CBC:   Lab Results   Component Value Date    WBC 15.3 (H) 2023    HGB 15.4 2023    HGB 12.6 2019    HCT 45.3 2023     2023     BMP:   Lab Results   Component Value Date     2023    POTASSIUM 3.3 (L) 2023    CHLORIDE 102 2023    CO2 23 2023    BUN 12.4 2023    CR 0.88 2023     (H) 2023    GLC 91 2019     COAGS: No results found for: \"PTT\", \"INR\", \"FIBR\"  POC:   Lab Results "   Component Value Date    HCG Negative 10/13/2023    HCGS Negative 09/22/2023     HEPATIC:   Lab Results   Component Value Date    ALBUMIN 4.5 09/22/2023    PROTTOTAL 7.3 09/22/2023    ALT 24 09/22/2023    AST 34 09/22/2023    ALKPHOS 84 09/22/2023    BILITOTAL 0.6 09/22/2023     OTHER:   Lab Results   Component Value Date    AIDA 9.5 09/22/2023    LIPASE 43 09/22/2023    TSH 1.25 09/19/2019       Anesthesia Plan    ASA Status:  3       Anesthesia Type: General.   Induction: Intravenous.           Consents    Anesthesia Plan(s) and associated risks, benefits, and realistic alternatives discussed. Questions answered and patient/representative(s) expressed understanding.     - Discussed:     - Discussed with:  Patient            Postoperative Care    Pain management: Multi-modal analgesia.   PONV prophylaxis: Ondansetron (or other 5HT-3), Dexamethasone or Solumedrol     Comments:                NED BEAL MD

## 2023-10-13 NOTE — DISCHARGE INSTRUCTIONS
"[facility]  Home Care Following Anesthesia  For 24 hours after surgery:  Get plenty of rest.  A responsible adult must stay with you for at least 24 hours after you leave the surgery center.  Do not drive or use heavy equipment.  If you have weakness or tingling, don't drive or use heavy equipment until this feeling goes away.   Do not drink alcohol.   Avoid strenuous or risky activities.  Ask for help when climbing stairs.  You may feel lightheaded.  IF so, sit for a few minutes before standing.  Have someone help you get up.   If you have nausea (feel sick to your stomach): Drink only clear liquids such as apple juice, ginger ale, broth or 7-Up.  Rest may also help.  Be sure to drink enough fluids.  Move to a regular diet as you feel able.   You may have a slight fever.  Call the doctor if your fever is over 100 F (37.7 C) (taken under the tongue) or lasts longer than 24 hours.  You may have a dry mouth, a sore throat, muscle aches or trouble sleeping. These should go away after 24 hours.  Do not make important or legal decisions.   It is recommended to avoid smoking.        Today you received a Marcaine or bupivacaine block to numb the nerves near your surgery site.  This is a block using local anesthetic or \"numbing\" medication injected around the nerves to anesthetize or \"numb\" the area supplied by those nerves.  This block is injected into the muscle layer near your surgical site.  The medication may numb the location where you had surgery for 6-18 hours, but may last up to 24 hours.  If your surgical site is an arm or leg you should be careful with your affected limb, since it is possible to injure your limb without being aware of it due to the numbing.  Until full feeling returns, you should guard against bumping or hitting your limb, and avoid extreme hot or cold temperatures on the skin.  As the block wears off, the feeling will return as a tingling or prickly " sensation near your surgical site.  You will experience more discomfort from your incision as the feeling returns.  You may want to take a pain pill (a narcotic or Tylenol if this was prescribed by your surgeon) when you start to experience mild pain before the pain beccomes more severe.  If your pain medications do not control your pain you should notifiy your surgeon.    Tips for taking pain medications  To get the best pain relief possible, remember these points:  Take pain medications as directed, before pain becomes severe.  Pain medication can upset your stomach: taking it with food may help.  Constipation is a common side effect of pain medication. Drink plenty of  fluids.  Eat foods high in fiber. Take a stool softener if recommended by your doctor or pharmacist.  Do not drink alcohol, drive or operate machinery while taking pain medications.  Ask about other ways to control pain, such as with heat, ice or relaxation.    Tylenol/Acetaminophen Consumption    If you feel your pain relief is insufficient, you may take Tylenol/Acetaminophen in addition to your narcotic pain medication.   Be careful not to exceed 4,000 mg of Tylenol/Acetaminophen in a 24 hour period from all sources.  If you are taking extra strength Tylenol/acetaminophen (500 mg), the maximum dose is 8 tablets in 24 hours.  If you are taking regular strength acetaminophen (325 mg), the maximum dose is 12 tablets in 24 hours.    Call a doctor for any of the following:  Signs of infection (fever, growing tenderness at the surgery site, a large amount of drainage or bleeding, severe pain, foul-smelling drainage, redness, swelling).  It has been over 8 to 10 hours since surgery and you are still not able to urinate (pass water).  Headache for over 24 hours.  Signs of Covid-19 infection (temperature over 100 degrees, shortness of breath, cough, loss of taste/smell, generalized body aches, persistent headache, chills, sore throat,  nausea/vomiting/diarrhea)  Your doctor is:  Dr. Demetrius Bean, General Surgery: [phone]                  Or dial [phone] and ask for the resident on call for:  General Surgery  For emergency care, call the:  [place] [facility]:  [phone] (TTY for hearing impaired: [phone])

## 2023-10-13 NOTE — ANESTHESIA CARE TRANSFER NOTE
Patient: Makeda Sanches    Procedure: Procedure(s):  CHOLECYSTECTOMY, ROBOT-ASSISTED, LAPAROSCOPIC, WITHOUT CHOLANGIOGRAM       Diagnosis: Gallstones [K80.20]  Diagnosis Additional Information: No value filed.    Anesthesia Type:   General     Note:    Oropharynx: oropharynx clear of all foreign objects  Level of Consciousness: awake  Oxygen Supplementation: face mask    Independent Airway: airway patency satisfactory and stable  Dentition: dentition unchanged  Vital Signs Stable: post-procedure vital signs reviewed and stable    Patient transferred to: PACU    Handoff Report: Identifed the Patient, Identified the Reponsible Provider, Reviewed the pertinent medical history, Discussed the surgical course, Reviewed Intra-OP anesthesia mangement and issues during anesthesia, Set expectations for post-procedure period and Allowed opportunity for questions and acknowledgement of understanding      Vitals:  Vitals Value Taken Time   /63 10/13/23 1137   Temp 36.7  C (98  F) 10/13/23 1137   Pulse 96 10/13/23 1140   Resp 10 10/13/23 1140   SpO2 99 % 10/13/23 1140   Vitals shown include unfiled device data.    Electronically Signed By: GEREMIAS Mohr CRNA  October 13, 2023  11:41 AM

## 2023-10-16 ENCOUNTER — PATIENT OUTREACH (OUTPATIENT)
Dept: SURGERY | Facility: CLINIC | Age: 23
End: 2023-10-16
Payer: COMMERCIAL

## 2023-10-16 NOTE — PROGRESS NOTES
RN Post-Op/Post-Discharge Care Coordination Note    Ms. Makeda Sanches is a 23 year old female who underwent robotic cholecystectomy on 10/13 with  Dr. Demetrius Bean.  Spoke with Patient.    Support  Patient able to care for self independently.     Health Status  Nausea/Vomiting: Patient denies nausea/vomiting.  Eating/drinking: Patient is able to eat and drink without any complaints.  Bowel habits: Patient reports no bowel movement since surgery. Is passing gas. Advised to use OTC Senna or Miralax per package instructions.   Fevers/chills: Patient denies any fever or chills.  Incisions: Patient denies any signs and symptoms of infection. Wound closure: Steri-strips  Pain: no reports of pain   New Medications:  Norco    Activity/Restrictions  No lifting in excess of 15-20 pounds for 3-4 weeks    Equipment  None    Pathology reviewed with patient:  No, not yet available    Forms/Letters  No    All of her questions were answered including reviewing restrictions and wound care.  She will call this office if she has any further questions and/or concerns.      In lieu of a post-op clinic patient that patient would like to be contacted in approximately 7-10 days via telephone.    A copy of this note was routed to the primary surgeon.      Whom and When to Call  Patient acknowledges understanding of how to manage any medication changes and when to seek medical care.     Patient advised that if after hour medical concerns arise to please call [phone] and choose option 4 to speak to the physician on call.

## 2023-10-17 LAB
PATH REPORT.COMMENTS IMP SPEC: NORMAL
PATH REPORT.COMMENTS IMP SPEC: NORMAL
PATH REPORT.FINAL DX SPEC: NORMAL
PATH REPORT.GROSS SPEC: NORMAL
PATH REPORT.MICROSCOPIC SPEC OTHER STN: NORMAL
PATH REPORT.RELEVANT HX SPEC: NORMAL
PHOTO IMAGE: NORMAL

## 2023-10-23 ENCOUNTER — MYC MEDICAL ADVICE (OUTPATIENT)
Dept: SURGERY | Facility: CLINIC | Age: 23
End: 2023-10-23
Payer: COMMERCIAL

## 2023-10-23 DIAGNOSIS — E66.01 CLASS 3 SEVERE OBESITY DUE TO EXCESS CALORIES WITH SERIOUS COMORBIDITY AND BODY MASS INDEX (BMI) OF 40.0 TO 44.9 IN ADULT (H): ICD-10-CM

## 2023-10-23 DIAGNOSIS — E66.813 CLASS 3 SEVERE OBESITY DUE TO EXCESS CALORIES WITH SERIOUS COMORBIDITY AND BODY MASS INDEX (BMI) OF 40.0 TO 44.9 IN ADULT (H): ICD-10-CM

## 2023-10-23 RX ORDER — SEMAGLUTIDE 2.4 MG/.75ML
2.4 INJECTION, SOLUTION SUBCUTANEOUS WEEKLY
Qty: 9 ML | Refills: 3 | Status: SHIPPED | OUTPATIENT
Start: 2023-10-30 | End: 2024-01-03

## 2023-10-24 ENCOUNTER — PATIENT OUTREACH (OUTPATIENT)
Dept: SURGERY | Facility: CLINIC | Age: 23
End: 2023-10-24
Payer: COMMERCIAL

## 2023-10-24 NOTE — PROGRESS NOTES
Makeda Sanches was contacted several days post procedure via telephone for a status update and elected to have a telephone follow-up call approximately 7-10 days after original contact in lieu of a clinic visit with Dr. Demetrius Bean.  She continues to do well and the steri-strips  have peeled off.  The patients wounds are healed and the area is C/D/I.  She is afebrile, pain free, and brennan po; the patient is slowly resuming her normal activity. Discussed restrictions including no lifting in excess of 15 pounds for 2 more weeks.    Pathology was reviewed with the patient: Yes  Final Diagnosis   GALLBLADDER; CHOLECYSTECTOMY:   Cholelithiases        All of Makeda Sanches question's were answered and  she would like to return to the clinic on a PRN basis.  The patient is aware that  she may schedule a post op appointment at any time.    A copy of this note was routed to the patients surgeon.

## 2023-10-26 ENCOUNTER — TELEPHONE (OUTPATIENT)
Dept: SURGERY | Facility: CLINIC | Age: 23
End: 2023-10-26

## 2023-10-26 ENCOUNTER — OFFICE VISIT (OUTPATIENT)
Dept: SURGERY | Facility: CLINIC | Age: 23
End: 2023-10-26
Payer: COMMERCIAL

## 2023-10-26 ENCOUNTER — NURSE TRIAGE (OUTPATIENT)
Dept: NURSING | Facility: CLINIC | Age: 23
End: 2023-10-26

## 2023-10-26 VITALS
HEIGHT: 67 IN | BODY MASS INDEX: 35.09 KG/M2 | WEIGHT: 223.6 LBS | OXYGEN SATURATION: 99 % | HEART RATE: 110 BPM | DIASTOLIC BLOOD PRESSURE: 86 MMHG | SYSTOLIC BLOOD PRESSURE: 123 MMHG

## 2023-10-26 DIAGNOSIS — Z98.890 POSTOPERATIVE STATE: Primary | ICD-10-CM

## 2023-10-26 PROBLEM — E66.01 CLASS 2 SEVERE OBESITY DUE TO EXCESS CALORIES WITH SERIOUS COMORBIDITY IN ADULT (H): Status: ACTIVE | Noted: 2023-10-26

## 2023-10-26 PROBLEM — E66.812 CLASS 2 SEVERE OBESITY DUE TO EXCESS CALORIES WITH SERIOUS COMORBIDITY IN ADULT (H): Status: ACTIVE | Noted: 2023-10-26

## 2023-10-26 PROCEDURE — 99024 POSTOP FOLLOW-UP VISIT: CPT | Performed by: SURGERY

## 2023-10-26 ASSESSMENT — PAIN SCALES - GENERAL: PAINLEVEL: NO PAIN (0)

## 2023-10-26 NOTE — PROGRESS NOTES
Makeda Sanches is status post:  10/13/2023  Davinci laparoscopic cholecystectomy without grams (N/A) Procedure: CHOLECYSTECTOMY, ROBOT-ASSISTED, LAPAROSCOPIC, WITHOUT CHOLANGIOGRAM;  Surgeon: Demetrius Bean MD;  Location: [facility] OR  Surgery without complications.  Post-op course without complications.  Incisions examined, no signs of infection, 5 mm post site with some exudate, not infected, instructed on local wound care.  All of the patients questions were answered.  Standard post-op instructions and restrictions given.  Follow-up as needed

## 2023-10-26 NOTE — TELEPHONE ENCOUNTER
[facility] Call Center    Phone Message    May a detailed message be left on voicemail: yes     Reason for Call: Other: Pt stated th incision from their surgery is infected. Pt stated their is puss draining, the area is itchy and redness around incision. Pt scheduled for an appt on 11/02/2023. Pt was transferred to red flag triage.     Action Taken: Message routed to:  [facility] Surg    Travel Screening: Not Applicable

## 2023-10-26 NOTE — PATIENT INSTRUCTIONS
You met with Dr. Demetrius Bean.      Today's visit instructions:    Return to the Surgery Clinic on an as needed basis.     No infection is noted at your incision sites.  Please keep the area clean with soap and water and cover daily and as needed until healed.  Please contact our office with any questions or concerns.     If you have questions please contact Daniella RN or Adelina RN during regular clinic hours, Monday through Friday 7:30 AM - 4:00 PM, or you can contact us via [facility] at anytime.       If you have urgent needs after-hours, weekends, or holidays please call the hospital at [phone] and ask to speak with our on-call General Surgery Team.    Appointment [phone]  Nurse Advice (Daniella or Adelina): [phone]   Surgery Scheduler (Lanette): [phone]  Fax: [phone]

## 2023-10-26 NOTE — NURSING NOTE
"Chief Complaint   Patient presents with    RECHECK     Wound concerns       Vitals:    10/26/23 1232   BP: 123/86   BP Location: Left arm   Patient Position: Sitting   Cuff Size: Adult Large   Pulse: 110   SpO2: 99%   Weight: 101.4 kg (223 lb 9.6 oz)   Height: 1.702 m (5' 7\")       Body mass index is 35.02 kg/m .                          Rahul Amaro, EMT    "

## 2023-10-26 NOTE — LETTER
10/26/2023       RE: Makeda Sanches  [id number] [address] [id number]     Dear Colleague,    Thank you for referring your patient, Makeda Sanches, to the [facility] [place] at [facility]. Please see a copy of my visit note below.    Makeda Sanches is status post:  10/13/2023  Davinci laparoscopic cholecystectomy without grams (N/A) Procedure: CHOLECYSTECTOMY, ROBOT-ASSISTED, LAPAROSCOPIC, WITHOUT CHOLANGIOGRAM;  Surgeon: Demetrius Bean MD;  Location: [facility] OR  Surgery without complications.  Post-op course without complications.  Incisions examined, no signs of infection, 5 mm post site with some exudate, not infected, instructed on local wound care.  All of the patients questions were answered.  Standard post-op instructions and restrictions given.  Follow-up as needed      Again, thank you for allowing me to participate in the care of your patient.      Sincerely,    Demetrius Bean MD

## 2023-10-26 NOTE — TELEPHONE ENCOUNTER
"Nurse Triage SBAR    Is this a 2nd Level Triage? YES, LICENSED PRACTITIONER REVIEW IS REQUIRED    Situation: Drainage from incisions    Background: Patient had CHOLECYSTECTOMY, ROBOT-ASSISTED, LAPAROSCOPIC on 10/13/23  Had 4 incisions. States 2 of them appear infected. One is on left lower abdomen and one under umbilicus. They may be open a little on the ends, she gets nauseous looking at it so is not sure. Drops of light green drainage.   No fevers, no bleeding, no pain.   Redness around incisions, may have redness streaking.   No medicine allergies.  Assessment: possible incision infection    Protocol Recommended Disposition:   Go To Office Now    Recommendation: Please call patient at  [phone] with recommendations today.    Routed to provider/Team          Reason for Disposition   Pus or bad-smelling fluid draining from incision    Answer Assessment - Initial Assessment Questions  1. SYMPTOM: \"What's the main symptom you're concerned about?\" (e.g., drainage, incision opening up, pain, redness)     Draining, possibly open on ends of incsion  2. ONSET: \"When did draining  start?\"      One week ago   3. SURGERY: \"What surgery did you have?\"      CHOLECYSTECTOMY, ROBOT-ASSISTED, LAPAROSCOPIC,  4. DATE of SURGERY: \"When was the surgery?\"       10/13/23  5. INCISION SITE: \"Where is the incision located?\"       4 incisions  6. REDNESS: \"Is there any redness at the incision site?\" If Yes, ask: \"How wide across is the redness?\" (Inches, centimeters)       Red across incisions  7. PAIN: \"Is there any pain?\" If Yes, ask: \"How bad is it?\"  (Scale 1-10; or mild, moderate, severe)    - NONE (0): no pain    - MILD (1-3): doesn't interfere with normal activities     - MODERATE (4-7): interferes with normal activities or awakens from sleep     - SEVERE (8-10): excruciating pain, unable to do any normal activities      no  8. BLEEDING: \"Is there any bleeding?\" If Yes, ask: \"How much?\" and \"Where?\"      no  9. DRAINAGE: \"Is " "there any drainage from the incision site?\" If Yes, ask: \"What color and how much?\" (e.g., red, cloudy, pus; drops, teaspoon)      Light green, drops  10. FEVER: \"Do you have a fever?\" If Yes, ask: \"What is your temperature, how was it measured, and when did it start?\"        no  11. OTHER SYMPTOMS: \"Do you have any other symptoms?\" (e.g., dizziness, rash elsewhere on body, shaking chills, weakness)        Possibly red streaking away    Protocols used: Post-Op Incision Symptoms and Stekphoih-P-DC    "

## 2024-01-02 ENCOUNTER — MYC REFILL (OUTPATIENT)
Dept: FAMILY MEDICINE | Facility: CLINIC | Age: 24
End: 2024-01-02
Payer: COMMERCIAL

## 2024-01-02 ENCOUNTER — TELEPHONE (OUTPATIENT)
Dept: SURGERY | Facility: CLINIC | Age: 24
End: 2024-01-02

## 2024-01-02 DIAGNOSIS — F98.8 ATTENTION DEFICIT DISORDER, UNSPECIFIED HYPERACTIVITY PRESENCE: ICD-10-CM

## 2024-01-02 DIAGNOSIS — F90.0 ATTENTION DEFICIT HYPERACTIVITY DISORDER (ADHD), PREDOMINANTLY INATTENTIVE TYPE: ICD-10-CM

## 2024-01-02 RX ORDER — DEXTROAMPHETAMINE SACCHARATE, AMPHETAMINE ASPARTATE MONOHYDRATE, DEXTROAMPHETAMINE SULFATE AND AMPHETAMINE SULFATE 7.5; 7.5; 7.5; 7.5 MG/1; MG/1; MG/1; MG/1
30 CAPSULE, EXTENDED RELEASE ORAL DAILY
Qty: 90 CAPSULE | Refills: 0 | Status: SHIPPED | OUTPATIENT
Start: 2024-01-02 | End: 2024-01-25

## 2024-01-02 RX ORDER — DEXTROAMPHETAMINE SACCHARATE, AMPHETAMINE ASPARTATE MONOHYDRATE, DEXTROAMPHETAMINE SULFATE AND AMPHETAMINE SULFATE 2.5; 2.5; 2.5; 2.5 MG/1; MG/1; MG/1; MG/1
10 CAPSULE, EXTENDED RELEASE ORAL DAILY
Qty: 90 CAPSULE | Refills: 0 | Status: SHIPPED | OUTPATIENT
Start: 2024-01-02 | End: 2024-01-25

## 2024-01-02 NOTE — TELEPHONE ENCOUNTER
Patient is due for her shot today.     See Marlo  Pharmacy Technician, Loree  [facility]  Phone: [phone]  Fax: [phone]

## 2024-01-02 NOTE — TELEPHONE ENCOUNTER
Prior Authorization Approval    Medication: WEGOVY 2.4 MG/0.75ML SC SOAJ  Authorization Effective Date: 12/3/2023  Authorization Expiration Date: 7/30/2024  Approved Dose/Quantity: 3/28  Reference #: [id number]   Insurance Company: [employer] Non-Specialty PA's - Phone [phone] Fax [phone]  Expected CoPay: $    CoPay Card Available:      Financial Assistance Needed:   Which Pharmacy is filling the prescription: [facility] [place], [place] - [address] [facility]  Pharmacy Notified: Yes  Patient Notified: Yes

## 2024-01-03 ENCOUNTER — MYC MEDICAL ADVICE (OUTPATIENT)
Dept: SURGERY | Facility: CLINIC | Age: 24
End: 2024-01-03
Payer: COMMERCIAL

## 2024-01-03 DIAGNOSIS — E66.01 CLASS 3 SEVERE OBESITY DUE TO EXCESS CALORIES WITH SERIOUS COMORBIDITY AND BODY MASS INDEX (BMI) OF 40.0 TO 44.9 IN ADULT (H): ICD-10-CM

## 2024-01-03 DIAGNOSIS — E66.813 CLASS 3 SEVERE OBESITY DUE TO EXCESS CALORIES WITH SERIOUS COMORBIDITY AND BODY MASS INDEX (BMI) OF 40.0 TO 44.9 IN ADULT (H): ICD-10-CM

## 2024-01-03 RX ORDER — SEMAGLUTIDE 2.4 MG/.75ML
2.4 INJECTION, SOLUTION SUBCUTANEOUS WEEKLY
Qty: 9 ML | Refills: 3 | Status: SHIPPED | OUTPATIENT
Start: 2024-01-03

## 2024-01-12 ENCOUNTER — TELEPHONE (OUTPATIENT)
Dept: SURGERY | Facility: CLINIC | Age: 24
End: 2024-01-12

## 2024-01-12 ENCOUNTER — VIRTUAL VISIT (OUTPATIENT)
Dept: SURGERY | Facility: CLINIC | Age: 24
End: 2024-01-12
Payer: COMMERCIAL

## 2024-01-12 VITALS — HEIGHT: 67 IN | BODY MASS INDEX: 32.49 KG/M2 | WEIGHT: 207 LBS

## 2024-01-12 DIAGNOSIS — E66.811 CLASS 1 OBESITY WITH BODY MASS INDEX (BMI) OF 32.0 TO 32.9 IN ADULT, UNSPECIFIED OBESITY TYPE, UNSPECIFIED WHETHER SERIOUS COMORBIDITY PRESENT: Primary | ICD-10-CM

## 2024-01-12 PROCEDURE — 99213 OFFICE O/P EST LOW 20 MIN: CPT | Mod: 95 | Performed by: FAMILY MEDICINE

## 2024-01-12 ASSESSMENT — PAIN SCALES - GENERAL: PAINLEVEL: NO PAIN (0)

## 2024-01-12 NOTE — PROGRESS NOTES
Virtual Visit Details    Type of service:  Video Visit     Originating Location (pt. Location): Home    Distant Location (provider location):  On-site  Platform used for Video Visit: [facility]    Bariatric Follow-up    23 year old  female BMI:Body mass index is 32.42 kg/m .    Weight:   Wt Readings from Last 1 Encounters:   01/12/24 93.9 kg (207 lb)    pounds    Comorbidities:  Patient Active Problem List   Diagnosis    Keratosis Pilaris    Murmurs    ADD (attention deficit disorder)    Vitamin D deficiency    Family history of hypothyroidism    Controlled substance agreement signed-CSA and urine tox done for Adderall XR 30 mg and 10 mg 7-11-22    Seasonal allergic rhinitis    Eczema    Hirsutism    Obesity    Fatigue    Gallstones    Class 2 severe obesity due to excess calories with serious comorbidity in adult (H)       Interim: 59# weight loss. Had her gallbladder out. Diarrhea getting better.     Plan:  DIET  Discussed whole foods and lean proteins   EXERCISE continue counting steps and walking in the am. Check out Kettlebell workiouts on YouTube   PHARMACOTHERAPY continue Wegovy 2.4mg weekly injections    We discussed the importance of hydration, staying ahead of potential constipation, and consuming 3 protein containing, nutrient dense meals while taking GLP-1 RA medications. Currently with loose stools s/p cholecystectomy. Discussed NWCR weight maintenance strategies       -We reviewed her medications and whether associated with weight gain.    Current Outpatient Medications:     amphetamine-dextroamphetamine (ADDERALL XR) 10 MG 24 hr capsule, Take 1 capsule (10 mg) by mouth daily, Disp: 90 capsule, Rfl: 0    amphetamine-dextroamphetamine (ADDERALL XR) 30 MG 24 hr capsule, Take 1 capsule (30 mg) by mouth daily, Disp: 90 capsule, Rfl: 0    metFORMIN (GLUCOPHAGE XR) 500 MG 24 hr tablet, Take 1 tablet (500 mg) by mouth 2 times daily (with meals), Disp: 180 tablet, Rfl: 3    Semaglutide-Weight Management  (WEGOVY) 2.4 MG/0.75ML pen, Inject 2.4 mg Subcutaneous once a week, Disp: 9 mL, Rfl: 3    vitamin B-12 (CYANOCOBALAMIN) 2500 MCG sublingual tablet, Take 1 tablet (2,500 mcg) by mouth daily, Disp: 90 tablet, Rfl: 3      We discussed [facility] Bariatric Basics including:  -eating 3 meals daily  -eating protein first  -eating slowly, chewing food well  -avoiding/limiting calorie containing beverages  -choosing wheat, not white with breads, crackers, pastas, suresh, bagels, tortillas, rice  -limiting restaurant or cafeteria eating to twice a week or less  -We discussed the importance of restorative sleep and stress management in maintaining a healthy weight.  -We discussed insulin resistance and glycemic index as it relates to appetite and weight control  -We discussed the National Weight Control Registry healthy weight maintenance strategies and ways to optimize metabolism.  -We discussed the importance of physical activity including cardiovascular and strength training in maintaining a healthier weight and explored viable options.    Most recent labs:  Lab Results   Component Value Date    WBC 15.3 (H) 09/22/2023    HGB 15.4 09/22/2023    HCT 45.3 09/22/2023    MCV 82 09/22/2023     09/22/2023       Lab Results   Component Value Date    ALT 24 09/22/2023    AST 34 09/22/2023    ALKPHOS 84 09/22/2023       DIETARY HISTORY  Meals Per Day: 3  Eating Protein First?: yes  Food Diary: B:cereal (Recess, Fruity Kiesha) and WW toast or eggs, lindsay L:Ham sandwich or PB and honey or Jelly D:Burger at work. At home would be steak, mashed potatoes, noodles,   Snacks Per Day: not much  Typical Snack:   Fluid Intake: intentional observed  Portion Control: improved  Calorie Containing Beverages: one soda per week  Alcohol per week: rare  Typical Protein Food Choices: see above  Choosing Whole Grains: yes  Grocery Shopping is done by: shared  Food Preparation is done by: shared  Meals at Restaurant/Cafeteria/Take Out Per Week:  "at work 1/wk  Eating at the Table:   TV is Off During Meals:     Positive Changes Since Last Visit: portion sizes  Struggling With: sleep-taking melatonin    Knowledgeable in Reading Food Labels: yes  Getting Adequate Protein: yes  Sleeping 7-8 hours/day getting better  Stress management walking, exercising    PHYSICAL ACTIVITY PATTERNS:  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  This accurately captures the substance of my conversation with the patient.  Thank you for the opportunity to participate in the care of your patient.    Frida Mathew MD, FAAFP  [facility]  Diplomate, American Board of Obesity Medicine    Total time spent on the date of this encounter doing: chart review, review of test results, patient visit, physical exam, education, counseling, developing plan of care, and documenting = 30 minutes.      "

## 2024-01-12 NOTE — NURSING NOTE
Is the patient currently in the state of [place]? YES    Visit mode:VIDEO    If the visit is dropped, the patient can be reconnected by: VIDEO VISIT: Text to cell phone:   Telephone Information:   Mobile [phone]       Will anyone else be joining the visit? NO  (If patient encounters technical issues they should call [phone]    How would you like to obtain your AVS? MyChart    Are changes needed to the allergy or medication list? No    Reason for visit: RECHECK    Volodymyr SERRA

## 2024-01-12 NOTE — PATIENT INSTRUCTIONS
"WEIGHT MAINTENANCE STRATEGIES    According to the National Weight Control Registry there are several things that people who have lost weight and kept it off have in common. Some of them are...    1. 3 MEALS A DAY:  Make sure you are eating 3 meals each day.  No skipping meals.  80% of people who skip meals are overweight or obese.  Missing meals slows the metabolism, making it harder to maintain a healthy weight.    2. FOOD DIARY:  Much like keeping a ledger for your checkbook, keeping a food and exercise diary helps you \"keep track\" of the balance of energy (calories) in and energy out. It also helps you recognize potential unhealthy deviations from healthy patterns before they become habit. It's a nice way to monitor whether you are getting the protein, fiber, and other nutrients that your body needs.    3. FOLLOW-UP:  Studies show that those who follow up with their health professional regularly maintained their weight loss and those who are \"lost to follow-up \"are at risk for regain.  Moreover, it is essential to monitor vitamin levels with laboratory studies for life following bariatric surgery.     4.  HIGH FIBER/LOW FAT:  Lean sources of protein (skim milk, skinless, baked or broiled chicken breast, fish, etc.)  will help you meet your protein needs while fruits, vegetables, and whole grains will help you get the fiber that your body needs.  This is heart healthy eating and helps to keep calorie levels in balance.    5.  8,000 STEPS PER DAY:  This is a \"weight maintenance dose. \"  It is essential to get your steps in every day, 7 days a week.  You don't have to \"work out \" 7 days a week, but throughout the day, getting 8000 steps will help you maintain the weight you have lost.  Parking far away, taking the stairs instead of the elevator, and pacing while on the phone are some ways to help achieve this goal.    6.  Eat at home 90% OF THE TIME:  People who maintain a healthy weight eat at home, or meal " "prepared at home, 90% of the time.  Studies show that people consume an average of 770 alf when eating out at a restaurant and 440 alf when eating a meal prepared at home.  This equates to almost 35 pounds of excess weight for a person who eats out once a day for 1 year.      OTHER HELPFUL HABITS    -Minimize liquid calories.  Skim milk is okay.  -Avoiding \"mindless \"eating, i.e., eating at the TV, and the car, in front of the computer.  -Protect your sleep and Manage your stress        OPTIMIZING YOUR METABOLISM FOR LIFE    1.  MUSCLE MAINTENANCE: Muscle burns calories up to 70% better than fat test.  As we age her body composition changes. We lose muscle mass.  Weight training can help us keep and even build muscle mass.  Dumbbells, pushups, rubber band training, weight machines are all examples of ways to keep and/or build muscle mass.    2.  MOVE: 8000 steps daily has been shown to be a weight maintenance dose.  Aim for 10,000 steps each day. Helpfull habits include taking the stairs instead of the elevator when possible, parking at the far end of the parking lot, pacing while on the phone, and taking the dog for a walk.  Of course using a treadmill, stair climber, elliptical , and bicycle are all ways of getting 10,000 steps.    3.  3 MEALS EACH DAY: Make sure to get your 3 meals each day.  Skipping breakfast, working through your lunch, or not eating dinner will lead to a slowing of your metabolism.  Studies show that 80% of people who skip meals are overweight or obese.    4.  ADEQUATE PROTEIN INTAKE: Getting adequate protein is beneficial for a number of reasons: to aid the healing process, to blunt cravings immediately after eating and for a period of time after eating, to help keep blood sugars level, and to help you maintain your muscle mass.  See #1 above.  "

## 2024-01-12 NOTE — LETTER
1/12/2024         RE: Makeda Sanches  [address] [facility] [address] [address]        Dear Colleague,    Thank you for referring your patient, Makeda Sanches, to the [facility] [place]. Please see a copy of my visit note below.    Virtual Visit Details    Type of service:  Video Visit     Originating Location (pt. Location): Home    Distant Location (provider location):  On-site  Platform used for Video Visit: [facility]    Bariatric Follow-up    23 year old  female BMI:Body mass index is 32.42 kg/m .    Weight:   Wt Readings from Last 1 Encounters:   01/12/24 93.9 kg (207 lb)    pounds    Comorbidities:  Patient Active Problem List   Diagnosis     Keratosis Pilaris     Murmurs     ADD (attention deficit disorder)     Vitamin D deficiency     Family history of hypothyroidism     Controlled substance agreement signed-CSA and urine tox done for Adderall XR 30 mg and 10 mg 7-11-22     Seasonal allergic rhinitis     Eczema     Hirsutism     Obesity     Fatigue     Gallstones     Class 2 severe obesity due to excess calories with serious comorbidity in adult (H)       Interim: 59# weight loss. Had her gallbladder out. Diarrhea getting better.     Plan:  DIET  Discussed whole foods and lean proteins   EXERCISE continue counting steps and walking in the am. Check out Kettlebell workiouts on YouTube   PHARMACOTHERAPY continue Wegovy 2.4mg weekly injections    We discussed the importance of hydration, staying ahead of potential constipation, and consuming 3 protein containing, nutrient dense meals while taking GLP-1 RA medications. Currently with loose stools s/p cholecystectomy. Discussed NWCR weight maintenance strategies       -We reviewed her medications and whether associated with weight gain.    Current Outpatient Medications:      amphetamine-dextroamphetamine (ADDERALL XR) 10 MG 24 hr capsule, Take 1 capsule (10 mg) by mouth daily, Disp: 90 capsule, Rfl: 0      amphetamine-dextroamphetamine (ADDERALL XR) 30 MG 24 hr capsule, Take 1 capsule (30 mg) by mouth daily, Disp: 90 capsule, Rfl: 0     metFORMIN (GLUCOPHAGE XR) 500 MG 24 hr tablet, Take 1 tablet (500 mg) by mouth 2 times daily (with meals), Disp: 180 tablet, Rfl: 3     Semaglutide-Weight Management (WEGOVY) 2.4 MG/0.75ML pen, Inject 2.4 mg Subcutaneous once a week, Disp: 9 mL, Rfl: 3     vitamin B-12 (CYANOCOBALAMIN) 2500 MCG sublingual tablet, Take 1 tablet (2,500 mcg) by mouth daily, Disp: 90 tablet, Rfl: 3      We discussed [facility] Bariatric Basics including:  -eating 3 meals daily  -eating protein first  -eating slowly, chewing food well  -avoiding/limiting calorie containing beverages  -choosing wheat, not white with breads, crackers, pastas, suresh, bagels, tortillas, rice  -limiting restaurant or cafeteria eating to twice a week or less  -We discussed the importance of restorative sleep and stress management in maintaining a healthy weight.  -We discussed insulin resistance and glycemic index as it relates to appetite and weight control  -We discussed the National Weight Control Registry healthy weight maintenance strategies and ways to optimize metabolism.  -We discussed the importance of physical activity including cardiovascular and strength training in maintaining a healthier weight and explored viable options.    Most recent labs:  Lab Results   Component Value Date    WBC 15.3 (H) 09/22/2023    HGB 15.4 09/22/2023    HCT 45.3 09/22/2023    MCV 82 09/22/2023     09/22/2023       Lab Results   Component Value Date    ALT 24 09/22/2023    AST 34 09/22/2023    ALKPHOS 84 09/22/2023       DIETARY HISTORY  Meals Per Day: 3  Eating Protein First?: yes  Food Diary: B:cereal (Recess, Fruity Kiesha) and WW toast or eggs, lindsay L:Ham sandwich or PB and honey or Jelly D:Burger at work. At home would be steak, mashed potatoes, noodles,   Snacks Per Day: not much  Typical Snack:   Fluid Intake: intentional  "observed  Portion Control: improved  Calorie Containing Beverages: one soda per week  Alcohol per week: rare  Typical Protein Food Choices: see above  Choosing Whole Grains: yes  Grocery Shopping is done by: shared  Food Preparation is done by: shared  Meals at Restaurant/Cafeteria/Take Out Per Week: at work 1/wk  Eating at the Table:   TV is Off During Meals:     Positive Changes Since Last Visit: portion sizes  Struggling With: sleep-taking melatonin    Knowledgeable in Reading Food Labels: yes  Getting Adequate Protein: yes  Sleeping 7-8 hours/day getting better  Stress management walking, exercising    PHYSICAL ACTIVITY PATTERNS:  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  This accurately captures the substance of my conversation with the patient.  Thank you for the opportunity to participate in the care of your patient.    Frida Mathew MD, FAAFP  [facility]  Diplomate, American Board of Obesity Medicine    Total time spent on the date of this encounter doing: chart review, review of test results, patient visit, physical exam, education, counseling, developing plan of care, and documenting = 30 minutes.        Again, thank you for allowing me to participate in the care of your patient.        Sincerely,        Frida Mathew MD  "

## 2024-01-25 ENCOUNTER — OFFICE VISIT (OUTPATIENT)
Dept: FAMILY MEDICINE | Facility: CLINIC | Age: 24
End: 2024-01-25
Payer: COMMERCIAL

## 2024-01-25 VITALS
OXYGEN SATURATION: 100 % | WEIGHT: 205.2 LBS | BODY MASS INDEX: 32.98 KG/M2 | DIASTOLIC BLOOD PRESSURE: 78 MMHG | RESPIRATION RATE: 14 BRPM | TEMPERATURE: 97.6 F | HEIGHT: 66 IN | HEART RATE: 80 BPM | SYSTOLIC BLOOD PRESSURE: 128 MMHG

## 2024-01-25 DIAGNOSIS — Z00.00 ENCOUNTER FOR PREVENTATIVE ADULT HEALTH CARE EXAMINATION: Primary | ICD-10-CM

## 2024-01-25 DIAGNOSIS — E66.811 CLASS 1 OBESITY WITHOUT SERIOUS COMORBIDITY WITH BODY MASS INDEX (BMI) OF 32.0 TO 32.9 IN ADULT, UNSPECIFIED OBESITY TYPE: ICD-10-CM

## 2024-01-25 DIAGNOSIS — E55.9 VITAMIN D DEFICIENCY: ICD-10-CM

## 2024-01-25 DIAGNOSIS — D72.829 LEUKOCYTOSIS, UNSPECIFIED TYPE: ICD-10-CM

## 2024-01-25 DIAGNOSIS — Z23 HIGH PRIORITY FOR 2019-NCOV VACCINE: ICD-10-CM

## 2024-01-25 DIAGNOSIS — F98.8 ATTENTION DEFICIT DISORDER, UNSPECIFIED HYPERACTIVITY PRESENCE: ICD-10-CM

## 2024-01-25 DIAGNOSIS — F40.232 FEAR ASSOCIATED WITH HEALTHCARE: ICD-10-CM

## 2024-01-25 DIAGNOSIS — Z79.899 CONTROLLED SUBSTANCE AGREEMENT SIGNED: ICD-10-CM

## 2024-01-25 DIAGNOSIS — Z11.3 SCREENING FOR STDS (SEXUALLY TRANSMITTED DISEASES): ICD-10-CM

## 2024-01-25 DIAGNOSIS — Z83.49 FAMILY HISTORY OF HYPOTHYROIDISM: ICD-10-CM

## 2024-01-25 PROBLEM — E66.9 OBESITY: Status: RESOLVED | Noted: 2023-05-30 | Resolved: 2024-01-25

## 2024-01-25 PROBLEM — E66.812 CLASS 2 SEVERE OBESITY DUE TO EXCESS CALORIES WITH SERIOUS COMORBIDITY IN ADULT (H): Status: RESOLVED | Noted: 2023-10-26 | Resolved: 2024-01-25

## 2024-01-25 PROBLEM — E66.01 CLASS 2 SEVERE OBESITY DUE TO EXCESS CALORIES WITH SERIOUS COMORBIDITY IN ADULT (H): Status: RESOLVED | Noted: 2023-10-26 | Resolved: 2024-01-25

## 2024-01-25 PROCEDURE — 99213 OFFICE O/P EST LOW 20 MIN: CPT | Mod: 25 | Performed by: FAMILY MEDICINE

## 2024-01-25 PROCEDURE — 99395 PREV VISIT EST AGE 18-39: CPT | Performed by: FAMILY MEDICINE

## 2024-01-25 PROCEDURE — 87491 CHLMYD TRACH DNA AMP PROBE: CPT | Performed by: FAMILY MEDICINE

## 2024-01-25 PROCEDURE — 87591 N.GONORRHOEAE DNA AMP PROB: CPT | Performed by: FAMILY MEDICINE

## 2024-01-25 RX ORDER — DEXTROAMPHETAMINE SACCHARATE, AMPHETAMINE ASPARTATE MONOHYDRATE, DEXTROAMPHETAMINE SULFATE AND AMPHETAMINE SULFATE 7.5; 7.5; 7.5; 7.5 MG/1; MG/1; MG/1; MG/1
30 CAPSULE, EXTENDED RELEASE ORAL DAILY
Qty: 90 CAPSULE | Refills: 0 | Status: SHIPPED | OUTPATIENT
Start: 2024-01-25 | End: 2024-07-21

## 2024-01-25 RX ORDER — HYDROXYZINE PAMOATE 25 MG/1
CAPSULE ORAL
Qty: 30 CAPSULE | Refills: 3 | Status: SHIPPED | OUTPATIENT
Start: 2024-01-25 | End: 2024-02-06

## 2024-01-25 ASSESSMENT — ENCOUNTER SYMPTOMS
HEMATURIA: 0
DIARRHEA: 1
DYSURIA: 0
FEVER: 0
SORE THROAT: 0
NERVOUS/ANXIOUS: 0
WEAKNESS: 0
CONSTIPATION: 0
MYALGIAS: 0
PARESTHESIAS: 0
EYE PAIN: 0
HEMATOCHEZIA: 0
PALPITATIONS: 0
JOINT SWELLING: 0
HEARTBURN: 0
ABDOMINAL PAIN: 0
CHILLS: 0
COUGH: 0
SHORTNESS OF BREATH: 0
HEADACHES: 0
NAUSEA: 0
FREQUENCY: 0
DIZZINESS: 0
ARTHRALGIAS: 0

## 2024-01-25 NOTE — PATIENT INSTRUCTIONS
We will help set nurse apt for shots.  Tdap and COVID.  Patient declined the flu shot.    We will help set a lab appointment.    Pap next year with physical.    Set office visit for med check in 6 months.    Great job with weight loss!    We renewed the controlled substance agreement and urine tox for Adderall XR 30 mg.  I will send 90 days of medication.         Urine Gonorrhea and chlamydia today.    Prescribed hydroxyzine 25 mg 1-2 tabs as needed before your nurse appointment for shots and lab appointment.

## 2024-01-25 NOTE — LETTER
[facility]  01/25/24  Patient: Makeda Sanches  YOB: 2000  Medical Record Number: [id number]                                                                                  Non-Opioid Controlled Substance Agreement    This is an agreement between you and your provider regarding safe and appropriate use of controlled substances prescribed by your care team. Controlled substances are?medicines that can cause physical and mental dependence (abuse).     There are strict laws about having and using these medicines. We here at [facility] are  committed to working with you in your efforts to get better. To support you in this work, we'll help you schedule regular office appointments for medicine refills. If we must cancel or change your appointment for any reason, we'll make sure you have enough medicine to last until your next appointment.     As a Provider, I will:   Listen carefully to your concerns while treating you with respect.   Recommend a treatment plan that I believe is in your best interest and may involve therapies other than medicine.    Talk with you often about the possible benefits and the risk of harm of any medicine that we prescribe for you.  Assess the safety of this medicine and check how well it works.    Provide a plan on how to taper (discontinue or go off) using this medicine if the decision is made to stop its use.      ::  As a Patient, I understand controlled substances:     Are prescribed by my care provider to help me function or work and manage my condition(s).?  Are strong medicines and can cause serious side effects.     Need to be taken exactly as prescribed.?Combining controlled substances with certain medicines or chemicals (such as illegal drugs, alcohol, sedatives, sleeping pills, and benzodiazepines) can be dangerous or even fatal.? If I stop taking my medicines suddenly, I may have severe withdrawal symptoms.     The risks,  benefits, and side effects of these medicine(s) were explained to me. I agree that:    I will take part in other treatments as advised by my care team. This may be psychiatry or counseling, physical therapy, behavioral therapy, group treatment or a referral to specialist.    I will keep all my appointments and understand this is part of the monitoring of controlled substances.?My care team may require an office visit for EVERY controlled substance refill. If I miss appointments or don t follow instructions, my care team may stop my medicine    I will take my medicines as prescribed. I will not change the dose or schedule unless my care team tells me to. There will be no refills if I run out early.      I may be asked to come to the clinic and complete a urine drug test or complete a pill count. If I don t give a urine sample or participate in a pill count, the care team may stop my medicine.    I will only receive controlled substance prescriptions from this clinic. If I am treated by another provider, I will tell them that I am taking controlled substances and that I have a treatment agreement with this provider. I will inform my [facility] care team within one business day if I am given a prescription for any controlled substance by another healthcare provider. My [facility] care team can contact other providers and pharmacists about my use of any medicines.    It is up to me to make sure that I don't run out of my medicines on weekends or holidays.?If my care team is willing to refill my prescription without a visit, I must request refills only during office hours. Refills may take up to 3 business days to process. I will use one pharmacy to fill all my controlled substance prescriptions. I will notify the clinic about any changes to my insurance or medicine availability.    I am responsible for my prescriptions. If the medicine/prescription is lost, stolen or destroyed, it will not be replaced.?I  also agree not to share controlled substance medicines with anyone.     I am aware I should not use any illegal or recreational drugs. I agree not to drink alcohol unless my care team says I can.     If I enroll in the [place] Medical Cannabis program, I will tell my care team before my next refill.    I will tell my care team right away if I become pregnant, have a new medical problem treated outside of my regular clinic, or have a change in my medicines.     I understand that this medicine can affect my thinking, judgment and reaction time.? Alcohol and drugs affect the brain and body, which can affect the safety of my driving. Being under the influence of alcohol or drugs can affect my decision-making, behaviors, personal safety and the safety of others. Driving while impaired (DWI) can occur if a person is driving, operating or in physical control of a car, motorcycle, boat, snowmobile, ATV, motorbike, off-road vehicle or any other motor vehicle ([place] Statute 169A.20). I understand the risk if I choose to drive or operate any vehicle or machinery.    I understand that if I do not follow any of the conditions above, my prescriptions or treatment may be stopped or changed.   I agree that my provider, clinic care team and pharmacy may work with any city, state or federal law enforcement agency that investigates the misuse, sale or other diversion of my controlled medicine. I will allow my provider to discuss my care with, or share a copy of, this agreement with any other treating provider, pharmacy or emergency room where I receive care.     I have read this agreement and have asked questions about anything I did not understand.    ________________________________________________________  Patient Signature - Makeda Sanches     ___________________                   Date     ________________________________________________________  Provider Signature - Anna Martinez MD       ___________________                    Date     ________________________________________________________  Witness Signature (required if provider not present while patient signing)          ___________________                   Date

## 2024-01-25 NOTE — PROGRESS NOTES
Preventive Care Visit  [facility]  Anna Martinez MD, Family Medicine  Jan 25, 2024       SUBJECTIVE:   Makeda is a 23 year old, presenting for the following:  Physical        1/25/2024     2:07 PM   Additional Questions   Roomed by PRADEEP Al CMA([facility])     Healthy Habits:     Getting at least 3 servings of Calcium per day:  Yes    Bi-annual eye exam:  NO    Dental care twice a year:  NO    Sleep apnea or symptoms of sleep apnea:  None    Diet:  Regular (no restrictions)    Frequency of exercise:  2-3 days/week    Duration of exercise:  Greater than 60 minutes    Taking medications regularly:  Yes    Medication side effects:  None    Additional concerns today:  No    ADHD:  Doing well on the 30 mg XR, not needing the 10 mg.  Occasional problems falling asleep if she takes it too late in the day.  No chest pain or palpitation.  It is helping with her concentration and focus.      Today's PHQ-2 Score:       1/25/2024     1:56 PM   PHQ-2 ( 1999 [employer])   Q1: Little interest or pleasure in doing things 0   Q2: Feeling down, depressed or hopeless 0   PHQ-2 Score 0   Q1: Little interest or pleasure in doing things Not at all   Q2: Feeling down, depressed or hopeless Not at all   PHQ-2 Score 0                   Have you ever done Advance Care Planning? (For example, a Health Directive, POLST, or a discussion with a medical provider or your loved ones about your wishes): No, advance care planning information given to patient to review.  Advanced care planning was discussed at today's visit.    Social History     Tobacco Use    Smoking status: Never     Passive exposure: Never    Smokeless tobacco: Never   Substance Use Topics    Alcohol use: Not on file             1/25/2024     1:56 PM   Alcohol Use   Prescreen: >3 drinks/day or >7 drinks/week? No     Reviewed orders with patient.  Reviewed health maintenance and updated orders accordingly - Yes  Lab work is in process    Breast Cancer  "Screening:      History of abnormal Pap smear: Will do next year     Reviewed and updated as needed this visit by clinical staff   Tobacco  Allergies  Meds              Reviewed and updated as needed this visit by Provider                    Review of Systems   Constitutional:  Negative for chills and fever.   HENT:  Negative for congestion, ear pain, hearing loss and sore throat.    Eyes:  Negative for pain and visual disturbance.   Respiratory:  Negative for cough and shortness of breath.    Cardiovascular:  Negative for chest pain and palpitations.   Gastrointestinal:  Positive for diarrhea. Negative for abdominal pain, constipation and nausea.   Genitourinary:  Negative for dysuria, frequency, genital sores, hematuria and urgency.   Musculoskeletal:  Negative for arthralgias, joint swelling and myalgias.   Skin:  Negative for rash.   Neurological:  Negative for dizziness, weakness and headaches.   Psychiatric/Behavioral:  The patient is not nervous/anxious.          OBJECTIVE:   /78   Pulse 80   Temp 97.6  F (36.4  C) (Oral)   Resp 14   Ht 1.683 m (5' 6.25\")   Wt 93.1 kg (205 lb 3.2 oz)   LMP 01/24/2024 (Exact Date)   SpO2 100%   BMI 32.87 kg/m     Estimated body mass index is 32.87 kg/m  as calculated from the following:    Height as of this encounter: 1.683 m (5' 6.25\").    Weight as of this encounter: 93.1 kg (205 lb 3.2 oz).  Physical Exam  GENERAL: alert and no distress  EYES: Eyes grossly normal to inspection, PERRL and conjunctivae and sclerae normal  HENT: ear canals and TM's normal, nose and mouth without ulcers or lesions  NECK: no adenopathy, no asymmetry, masses, or scars  RESP: lungs clear to auscultation - no rales, rhonchi or wheezes  BREAST: normal without masses, tenderness or nipple discharge and no palpable axillary masses or adenopathy  CV: regular rate and rhythm, normal S1 S2, no S3 or S4, no murmur, click or rub, no peripheral edema  ABDOMEN: soft, nontender, no " hepatosplenomegaly, no masses and bowel sounds normal  MS: no gross musculoskeletal defects noted, no edema  SKIN: no suspicious lesions or rashes, but full-body skin exam not performed  NEURO: Normal strength and tone, mentation intact and speech normal  PSYCH: mentation appears normal, affect normal/bright  LYMPH: no cervical, supraclavicular, axillary, or inguinal adenopathy    Diagnostic Test Results:  Labs reviewed in Epic    ASSESSMENT/PLAN:       ICD-10-CM    1. Encounter for preventative adult health care examination  Z00.00 Lipid panel reflex to direct LDL Fasting      2. Screening for STDs (sexually transmitted diseases)  Z11.3 Chlamydia trachomatis/Neisseria gonorrhoeae by PCR- VAGINAL SELF-SWAB     Herpes Simplex Virus 1 and 2 IgG     HIV Antigen Antibody Combo Cascade     Treponema Abs w Reflex to RPR and Titer     Hepatitis C antibody     CANCELED: Neisseria gonorrhoeae PCR      3. High priority for 2019-nCoV vaccine  Z23       4. Attention deficit disorder, unspecified hyperactivity presence  F98.8 amphetamine-dextroamphetamine (ADDERALL XR) 30 MG 24 hr capsule      5. Controlled substance agreement signed-CSA and urine tox done for Adderall XR 30 mg 1-25-24  Z79.899       6. Class 1 obesity without serious comorbidity with body mass index (BMI) of 32.0 to 32.9 in adult, unspecified obesity type  E66.9 TSH with free T4 reflex    Z68.32       7. Leukocytosis, unspecified type  D72.829 WBC count      8. Fear associated with healthcare  F40.232 hydrOXYzine denis (VISTARIL) 25 MG capsule      9. Vitamin D deficiency  E55.9 Vitamin D Deficiency      10. Family history of hypothyroidism  Z83.49         We will help set nurse apt for shots.  Tdap and COVID.  Patient declined the flu shot.    We will help set a lab appointment.    Pap next year with physical.    Set office visit for med check in 6 months.    Great job with weight loss!    We renewed the controlled substance agreement and urine tox for Adderall  "XR 30 mg.  I will send 90 days of medication.         Urine Gonorrhea and chlamydia today.    Prescribed hydroxyzine 25 mg 1-2 tabs as needed before your nurse appointment for shots and lab appointment.    Patient has been advised of split billing requirements and indicates understanding: Yes      Counseling  Reviewed preventive health counseling, as reflected in patient instructions       Regular exercise       Healthy diet/nutrition      BMI  Estimated body mass index is 32.87 kg/m  as calculated from the following:    Height as of this encounter: 1.683 m (5' 6.25\").    Weight as of this encounter: 93.1 kg (205 lb 3.2 oz).   Weight management plan: Discussed healthy diet and exercise guidelines      She reports that she has never smoked. She has never been exposed to tobacco smoke. She has never used smokeless tobacco.          Signed Electronically by: Anna Martinez MD  Answers submitted by the patient for this visit:  Annual Preventive Visit (Submitted on 1/25/2024)  Chief Complaint: Annual Exam:  Blood in stool: No  heartburn: No  peripheral edema: No  mood changes: No  Skin sensation changes: No    "

## 2024-01-26 LAB
C TRACH DNA SPEC QL PROBE+SIG AMP: NEGATIVE
N GONORRHOEA DNA SPEC QL NAA+PROBE: NEGATIVE

## 2024-02-05 DIAGNOSIS — F40.232 FEAR ASSOCIATED WITH HEALTHCARE: ICD-10-CM

## 2024-02-06 RX ORDER — HYDROXYZINE PAMOATE 25 MG/1
CAPSULE ORAL
Qty: 180 CAPSULE | Refills: 0 | Status: SHIPPED | OUTPATIENT
Start: 2024-02-06 | End: 2024-06-27

## 2024-02-29 ENCOUNTER — ALLIED HEALTH/NURSE VISIT (OUTPATIENT)
Dept: FAMILY MEDICINE | Facility: CLINIC | Age: 24
End: 2024-02-29
Payer: COMMERCIAL

## 2024-02-29 ENCOUNTER — LAB (OUTPATIENT)
Dept: LAB | Facility: CLINIC | Age: 24
End: 2024-02-29
Payer: COMMERCIAL

## 2024-02-29 DIAGNOSIS — Z23 NEED FOR VIRAL IMMUNIZATION: Primary | ICD-10-CM

## 2024-02-29 DIAGNOSIS — E66.811 CLASS 1 OBESITY WITHOUT SERIOUS COMORBIDITY WITH BODY MASS INDEX (BMI) OF 32.0 TO 32.9 IN ADULT, UNSPECIFIED OBESITY TYPE: ICD-10-CM

## 2024-02-29 DIAGNOSIS — D72.829 LEUKOCYTOSIS, UNSPECIFIED TYPE: ICD-10-CM

## 2024-02-29 DIAGNOSIS — E55.9 VITAMIN D DEFICIENCY: ICD-10-CM

## 2024-02-29 DIAGNOSIS — Z00.00 ENCOUNTER FOR PREVENTATIVE ADULT HEALTH CARE EXAMINATION: ICD-10-CM

## 2024-02-29 DIAGNOSIS — Z11.3 SCREENING FOR STDS (SEXUALLY TRANSMITTED DISEASES): ICD-10-CM

## 2024-02-29 LAB — WBC # BLD AUTO: 9.1 10E3/UL (ref 4–11)

## 2024-02-29 PROCEDURE — 86696 HERPES SIMPLEX TYPE 2 TEST: CPT

## 2024-02-29 PROCEDURE — 86780 TREPONEMA PALLIDUM: CPT

## 2024-02-29 PROCEDURE — 90471 IMMUNIZATION ADMIN: CPT

## 2024-02-29 PROCEDURE — 82306 VITAMIN D 25 HYDROXY: CPT

## 2024-02-29 PROCEDURE — 36415 COLL VENOUS BLD VENIPUNCTURE: CPT

## 2024-02-29 PROCEDURE — 91320 SARSCV2 VAC 30MCG TRS-SUC IM: CPT

## 2024-02-29 PROCEDURE — 86695 HERPES SIMPLEX TYPE 1 TEST: CPT

## 2024-02-29 PROCEDURE — 87389 HIV-1 AG W/HIV-1&-2 AB AG IA: CPT

## 2024-02-29 PROCEDURE — 90480 ADMN SARSCOV2 VAC 1/ONLY CMP: CPT

## 2024-02-29 PROCEDURE — 80061 LIPID PANEL: CPT

## 2024-02-29 PROCEDURE — 99207 PR NO CHARGE NURSE ONLY: CPT

## 2024-02-29 PROCEDURE — 86803 HEPATITIS C AB TEST: CPT

## 2024-02-29 PROCEDURE — 90715 TDAP VACCINE 7 YRS/> IM: CPT

## 2024-02-29 PROCEDURE — 85048 AUTOMATED LEUKOCYTE COUNT: CPT

## 2024-02-29 PROCEDURE — 84443 ASSAY THYROID STIM HORMONE: CPT

## 2024-03-01 LAB
CHOLEST SERPL-MCNC: 147 MG/DL
FASTING STATUS PATIENT QL REPORTED: NO
HCV AB SERPL QL IA: NONREACTIVE
HDLC SERPL-MCNC: 41 MG/DL
HIV 1+2 AB+HIV1 P24 AG SERPL QL IA: NONREACTIVE
HSV1 IGG SERPL QL IA: 0.41 INDEX
HSV1 IGG SERPL QL IA: NORMAL
HSV2 IGG SERPL QL IA: 0.17 INDEX
HSV2 IGG SERPL QL IA: NORMAL
LDLC SERPL CALC-MCNC: 93 MG/DL
NONHDLC SERPL-MCNC: 106 MG/DL
T PALLIDUM AB SER QL: NONREACTIVE
TRIGL SERPL-MCNC: 67 MG/DL
TSH SERPL DL<=0.005 MIU/L-ACNC: 3.62 UIU/ML (ref 0.3–4.2)
VIT D+METAB SERPL-MCNC: 20 NG/ML (ref 20–50)

## 2024-05-12 DIAGNOSIS — F40.232 FEAR ASSOCIATED WITH HEALTHCARE: ICD-10-CM

## 2024-05-13 ENCOUNTER — MYC REFILL (OUTPATIENT)
Dept: SURGERY | Facility: CLINIC | Age: 24
End: 2024-05-13
Payer: COMMERCIAL

## 2024-05-13 DIAGNOSIS — E28.2 PCOS (POLYCYSTIC OVARIAN SYNDROME): ICD-10-CM

## 2024-05-13 RX ORDER — HYDROXYZINE PAMOATE 25 MG/1
CAPSULE ORAL
Qty: 180 CAPSULE | Refills: 0 | OUTPATIENT
Start: 2024-05-13

## 2024-05-14 RX ORDER — METFORMIN HCL 500 MG
500 TABLET, EXTENDED RELEASE 24 HR ORAL 2 TIMES DAILY WITH MEALS
Qty: 180 TABLET | Refills: 3 | Status: SHIPPED | OUTPATIENT
Start: 2024-05-14 | End: 2024-07-21

## 2024-06-27 ENCOUNTER — OFFICE VISIT (OUTPATIENT)
Dept: FAMILY MEDICINE | Facility: CLINIC | Age: 24
End: 2024-06-27
Payer: COMMERCIAL

## 2024-06-27 VITALS
RESPIRATION RATE: 16 BRPM | TEMPERATURE: 98.3 F | SYSTOLIC BLOOD PRESSURE: 127 MMHG | OXYGEN SATURATION: 100 % | DIASTOLIC BLOOD PRESSURE: 89 MMHG | BODY MASS INDEX: 29.7 KG/M2 | WEIGHT: 185.4 LBS | HEART RATE: 95 BPM

## 2024-06-27 DIAGNOSIS — F98.8 ATTENTION DEFICIT DISORDER, UNSPECIFIED HYPERACTIVITY PRESENCE: Primary | ICD-10-CM

## 2024-06-27 DIAGNOSIS — Z79.899 CONTROLLED SUBSTANCE AGREEMENT SIGNED: ICD-10-CM

## 2024-06-27 PROBLEM — Z12.4 CERVICAL CANCER SCREENING: Status: ACTIVE | Noted: 2024-06-27

## 2024-06-27 LAB
AMPHETAMINES UR QL SCN: ABNORMAL
BARBITURATES UR QL SCN: ABNORMAL
BENZODIAZ UR QL SCN: ABNORMAL
BZE UR QL SCN: ABNORMAL
CANNABINOIDS UR QL SCN: ABNORMAL
FENTANYL UR QL: ABNORMAL
OPIATES UR QL SCN: ABNORMAL
PCP QUAL URINE (ROCHE): ABNORMAL

## 2024-06-27 PROCEDURE — 99213 OFFICE O/P EST LOW 20 MIN: CPT | Performed by: FAMILY MEDICINE

## 2024-06-27 PROCEDURE — 80307 DRUG TEST PRSMV CHEM ANLYZR: CPT | Performed by: FAMILY MEDICINE

## 2024-06-27 NOTE — PROGRESS NOTES
Assessment & Plan       ICD-10-CM    1. Attention deficit disorder, unspecified hyperactivity presence  F98.8       2. Controlled substance agreement signed  Z79.899 Urine Drug Screen     Urine Drug Screen        Please set a physical in Jan.  We will do a med check and pap at that visit. Check that it is ok to do the physical with your insurance before Jan. 25.    Controlled substance agreement for Adderall XR 30 mg and urine tox done today.    Check in with psychology due in 2-4 years.    Prescription monitoring program reviewed and patient following the controlled substance agreement.            Ryan Ashford is a 24 year old, presenting for the following health issues:  Recheck Medication        6/27/2024    11:54 AM   Additional Questions   Roomed by Kelly RESENDEZ CMA     History of Present Illness       Reason for visit:  Med check, i think?    She eats 2-3 servings of fruits and vegetables daily.She consumes 1 sweetened beverage(s) daily.She exercises with enough effort to increase her heart rate 60 or more minutes per day.  She exercises with enough effort to increase her heart rate 5 days per week. She is missing 2 dose(s) of medications per week.  She is not taking prescribed medications regularly due to remembering to take and other.                 ADHD: Patient feels she is doing well on the medication and it is helping with her concentration.  No negative side effects like anorexia, chest pain, palpitations or insomnia.  She said she saw the psychologist about a year ago.    Obesity: She is following with the weight loss clinic and per patient report has lost about 90 pounds.  She said she is working hard on healthy diet and exercise as well.        Objective    /89 (BP Location: Left arm, Patient Position: Sitting, Cuff Size: Adult Large)   Pulse 95   Temp 98.3  F (36.8  C) (Oral)   Resp 16   Wt 84.1 kg (185 lb 6.4 oz)   SpO2 100%   BMI 29.70 kg/m    Body mass index is 29.7  kg/m .  Physical Exam   GENERAL: alert and no distress  RESP: lungs clear to auscultation - no rales, rhonchi or wheezes  CV: regular rate and rhythm, normal S1 S2, no S3 or S4, no murmur, click or rub, no peripheral edema              Signed Electronically by: Anna Martinez MD

## 2024-06-27 NOTE — PATIENT INSTRUCTIONS
Please set a physical in Jan.  We will do a med check and pap at that visit. Check that it is ok to do the physical with your insurance before Jan. 25.    Controlled substance agreement for Adderall XR 30 mg and urine tox done today.    Check in with psychology due in 2-4 years.

## 2024-06-27 NOTE — LETTER
[facility]  06/27/24  Patient: Makeda Sanches  YOB: 2000  Medical Record Number: [id number]                                                                                  Non-Opioid Controlled Substance Agreement    This is an agreement between you and your provider regarding safe and appropriate use of controlled substances prescribed by your care team. Controlled substances are?medicines that can cause physical and mental dependence (abuse).     There are strict laws about having and using these medicines. We here at [facility] are  committed to working with you in your efforts to get better. To support you in this work, we'll help you schedule regular office appointments for medicine refills. If we must cancel or change your appointment for any reason, we'll make sure you have enough medicine to last until your next appointment.     As a Provider, I will:   Listen carefully to your concerns while treating you with respect.   Recommend a treatment plan that I believe is in your best interest and may involve therapies other than medicine.    Talk with you often about the possible benefits and the risk of harm of any medicine that we prescribe for you.  Assess the safety of this medicine and check how well it works.    Provide a plan on how to taper (discontinue or go off) using this medicine if the decision is made to stop its use.      ::  As a Patient, I understand controlled substances:     Are prescribed by my care provider to help me function or work and manage my condition(s).?  Are strong medicines and can cause serious side effects.     Need to be taken exactly as prescribed.?Combining controlled substances with certain medicines or chemicals (such as illegal drugs, alcohol, sedatives, sleeping pills, and benzodiazepines) can be dangerous or even fatal.? If I stop taking my medicines suddenly, I may have severe withdrawal symptoms.     The risks,  benefits, and side effects of these medicine(s) were explained to me. I agree that:    I will take part in other treatments as advised by my care team. This may be psychiatry or counseling, physical therapy, behavioral therapy, group treatment or a referral to specialist.    I will keep all my appointments and understand this is part of the monitoring of controlled substances.?My care team may require an office visit for EVERY controlled substance refill. If I miss appointments or don t follow instructions, my care team may stop my medicine    I will take my medicines as prescribed. I will not change the dose or schedule unless my care team tells me to. There will be no refills if I run out early.      I may be asked to come to the clinic and complete a urine drug test or complete a pill count. If I don t give a urine sample or participate in a pill count, the care team may stop my medicine.    I will only receive controlled substance prescriptions from this clinic. If I am treated by another provider, I will tell them that I am taking controlled substances and that I have a treatment agreement with this provider. I will inform my [facility] care team within one business day if I am given a prescription for any controlled substance by another healthcare provider. My [facility] care team can contact other providers and pharmacists about my use of any medicines.    It is up to me to make sure that I don't run out of my medicines on weekends or holidays.?If my care team is willing to refill my prescription without a visit, I must request refills only during office hours. Refills may take up to 3 business days to process. I will use one pharmacy to fill all my controlled substance prescriptions. I will notify the clinic about any changes to my insurance or medicine availability.    I am responsible for my prescriptions. If the medicine/prescription is lost, stolen or destroyed, it will not be replaced.?I  also agree not to share controlled substance medicines with anyone.     I am aware I should not use any illegal or recreational drugs. I agree not to drink alcohol unless my care team says I can.     If I enroll in the [place] Medical Cannabis program, I will tell my care team before my next refill.    I will tell my care team right away if I become pregnant, have a new medical problem treated outside of my regular clinic, or have a change in my medicines.     I understand that this medicine can affect my thinking, judgment and reaction time.? Alcohol and drugs affect the brain and body, which can affect the safety of my driving. Being under the influence of alcohol or drugs can affect my decision-making, behaviors, personal safety and the safety of others. Driving while impaired (DWI) can occur if a person is driving, operating or in physical control of a car, motorcycle, boat, snowmobile, ATV, motorbike, off-road vehicle or any other motor vehicle ([place] Statute 169A.20). I understand the risk if I choose to drive or operate any vehicle or machinery.    I understand that if I do not follow any of the conditions above, my prescriptions or treatment may be stopped or changed.   I agree that my provider, clinic care team and pharmacy may work with any city, state or federal law enforcement agency that investigates the misuse, sale or other diversion of my controlled medicine. I will allow my provider to discuss my care with, or share a copy of, this agreement with any other treating provider, pharmacy or emergency room where I receive care.     I have read this agreement and have asked questions about anything I did not understand.    ________________________________________________________  Patient Signature - Makeda Sanches     ___________________                   Date     ________________________________________________________  Provider Signature - Anna Martinez MD       ___________________                    Date     ________________________________________________________  Witness Signature (required if provider not present while patient signing)          ___________________                   Date

## 2024-07-12 ENCOUNTER — VIRTUAL VISIT (OUTPATIENT)
Dept: SURGERY | Facility: CLINIC | Age: 24
End: 2024-07-12
Payer: COMMERCIAL

## 2024-07-12 VITALS — HEIGHT: 67 IN | BODY MASS INDEX: 27.47 KG/M2 | WEIGHT: 175 LBS

## 2024-07-12 DIAGNOSIS — E66.3 OVERWEIGHT (BMI 25.0-29.9): Primary | ICD-10-CM

## 2024-07-12 DIAGNOSIS — E28.2 PCOS (POLYCYSTIC OVARIAN SYNDROME): ICD-10-CM

## 2024-07-12 PROCEDURE — 99213 OFFICE O/P EST LOW 20 MIN: CPT | Mod: 95 | Performed by: FAMILY MEDICINE

## 2024-07-12 ASSESSMENT — PAIN SCALES - GENERAL: PAINLEVEL: NO PAIN (0)

## 2024-07-12 NOTE — NURSING NOTE
Current patient location: [address] [place] [id number]    Is the patient currently in the state of [place]? YES    Visit mode:VIDEO    If the visit is dropped, the patient can be reconnected by: VIDEO VISIT: Text to cell phone:   Telephone Information:   Mobile [phone]       Will anyone else be joining the visit? NO  (If patient encounters technical issues they should call [phone]    How would you like to obtain your AVS? MyChart    Are changes needed to the allergy or medication list? No and Pt stated no changes to allergies    Are refills needed on medications prescribed by this physician? NO    Reason for visit: Follow Up    Janet SERRA

## 2024-07-12 NOTE — LETTER
7/12/2024      Makeda Sanches  [address] [address] [address]      Dear Colleague,    Thank you for referring your patient, Makeda Sanches, to the [facility] [place]. Please see a copy of my visit note below.    Virtual Visit Details    Type of service:  Video Visit     Originating Location (pt. Location): Home    Distant Location (provider location):  On-site  Platform used for Video Visit: [facility]    Bariatric Follow-up    24 year old  female BMI:Body mass index is 27.4 kg/m .    Weight:   Wt Readings from Last 1 Encounters:   07/12/24 79.4 kg (175 lb)    pounds    Comorbidities:  Patient Active Problem List   Diagnosis     Keratosis Pilaris     Murmurs     ADD (attention deficit disorder)     Vitamin D deficiency     Family history of hypothyroidism     Controlled substance agreement signed-CSA and urine tox done for Adderall XR 30 mg 6-27-24     Seasonal allergic rhinitis     Eczema     Hirsutism     Fatigue     Gallstones     Cervical cancer screening         Current Outpatient Medications:      amphetamine-dextroamphetamine (ADDERALL XR) 30 MG 24 hr capsule, Take 1 capsule (30 mg) by mouth daily, Disp: 90 capsule, Rfl: 0     metFORMIN (GLUCOPHAGE XR) 500 MG 24 hr tablet, Take 1 tablet (500 mg) by mouth 2 times daily (with meals), Disp: 180 tablet, Rfl: 3     Semaglutide-Weight Management (WEGOVY) 2.4 MG/0.75ML pen, Inject 2.4 mg Subcutaneous once a week, Disp: 9 mL, Rfl: 3     vitamin B-12 (CYANOCOBALAMIN) 2500 MCG sublingual tablet, Take 1 tablet (2,500 mcg) by mouth daily, Disp: 90 tablet, Rfl: 3       Interim: Now on 2.4mg Wegovy. Maintaining a 91# weight loss. Gross burps after the injection. No constipation. Hydrating well.  No nausea. 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     Cholesterol   Date Value Ref Range Status   02/29/2024 147 <200 mg/dL Final        BP Readings from Last 3 Encounters:   06/27/24 127/89   01/25/24 128/78    10/26/23 123/86        Plan:  DIET  3 meals with protein, calorie free drinks   EXERCISE walking on campus. Might get a dog. Encouraged resistance ([place] has a nice gym)   PHARMACOTHERAPY continue wegovy and metformin and B-12    We discussed the importance of hydration, staying ahead of potential constipation, and consuming 3 protein containing, nutrient dense meals while taking GLP-1 RA medications.       -We reviewed her medications and whether associated with weight gain.        We discussed [facility] Bariatric Basics including:  -eating 3 meals daily  -eating protein first  -eating slowly, chewing food well  -avoiding/limiting calorie containing beverages  -choosing wheat, not white with breads, crackers, pastas, suresh, bagels, tortillas, rice  -limiting restaurant or cafeteria eating to twice a week or less  -We discussed the importance of restorative sleep and stress management in maintaining a healthy weight.  -We discussed insulin resistance and glycemic index as it relates to appetite and weight control  -We discussed the National Weight Control Registry healthy weight maintenance strategies and ways to optimize metabolism.  -We discussed the importance of physical activity including cardiovascular and strength training in maintaining a healthier weight and explored viable options.      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  Eating at  "the Table: yes  TV is Off During Meals:     Positive Changes Since Last Visit: multiple  Struggling With: plateau    Knowledgeable in Reading Food Labels: yes  Getting Adequate Protein: yes  Sleeping 7-8 hours/day 6-8 hours  Stress management moderate    PHYSICAL ACTIVITY PATTERNS:  Cardiovascular: swimming, takes walks  Strength Training: sometimes yoga video    REVIEW OF SYSTEMS    PHYSICAL EXAM: (most recent vitals and today's stated weight)  Vitals: Ht 1.702 m (5' 7.01\")   Wt 79.4 kg (175 lb)   BMI 27.40 kg/m        GEN: Pleasant and in no acute distress  PSYCH: A&OX3,     I have reviewed the note as documented above.  This accurately captures the substance of my conversation with the patient.  Thank you for the opportunity to participate in the care of your patient.    Frida Mathew MD, FAAFP  [facility]  Diplomate, American Board of Obesity Medicine    Total time spent on the date of this encounter doing: chart review, review of test results, patient visit, physical exam, education, counseling, developing plan of care, and documenting = 26 minutes.              Again, thank you for allowing me to participate in the care of your patient.        Sincerely,        Frida Mathew MD  "

## 2024-07-12 NOTE — PROGRESS NOTES
Virtual Visit Details    Type of service:  Video Visit     Originating Location (pt. Location): Home    Distant Location (provider location):  On-site  Platform used for Video Visit: [facility]    Bariatric Follow-up    24 year old  female BMI:Body mass index is 27.4 kg/m .    Weight:   Wt Readings from Last 1 Encounters:   07/12/24 79.4 kg (175 lb)    pounds    Comorbidities:  Patient Active Problem List   Diagnosis    Keratosis Pilaris    Murmurs    ADD (attention deficit disorder)    Vitamin D deficiency    Family history of hypothyroidism    Controlled substance agreement signed-CSA and urine tox done for Adderall XR 30 mg 6-27-24    Seasonal allergic rhinitis    Eczema    Hirsutism    Fatigue    Gallstones    Cervical cancer screening         Current Outpatient Medications:     amphetamine-dextroamphetamine (ADDERALL XR) 30 MG 24 hr capsule, Take 1 capsule (30 mg) by mouth daily, Disp: 90 capsule, Rfl: 0    metFORMIN (GLUCOPHAGE XR) 500 MG 24 hr tablet, Take 1 tablet (500 mg) by mouth 2 times daily (with meals), Disp: 180 tablet, Rfl: 3    Semaglutide-Weight Management (WEGOVY) 2.4 MG/0.75ML pen, Inject 2.4 mg Subcutaneous once a week, Disp: 9 mL, Rfl: 3    vitamin B-12 (CYANOCOBALAMIN) 2500 MCG sublingual tablet, Take 1 tablet (2,500 mcg) by mouth daily, Disp: 90 tablet, Rfl: 3       Interim: Now on 2.4mg Wegovy. Maintaining a 91# weight loss. Gross burps after the injection. No constipation. Hydrating well.  No nausea. 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     Cholesterol   Date Value Ref Range Status   02/29/2024 147 <200 mg/dL Final        BP Readings from Last 3 Encounters:   06/27/24 127/89   01/25/24 128/78   10/26/23 123/86        Plan:  DIET  3 meals with protein, calorie free drinks   EXERCISE walking on campus. Might get a dog. Encouraged resistance ([place] has a nice gym)   PHARMACOTHERAPY continue wegovy and metformin and B-12    We discussed the importance of hydration, staying  ahead of potential constipation, and consuming 3 protein containing, nutrient dense meals while taking GLP-1 RA medications.       -We reviewed her medications and whether associated with weight gain.        We discussed [facility] Bariatric Basics including:  -eating 3 meals daily  -eating protein first  -eating slowly, chewing food well  -avoiding/limiting calorie containing beverages  -choosing wheat, not white with breads, crackers, pastas, suresh, bagels, tortillas, rice  -limiting restaurant or cafeteria eating to twice a week or less  -We discussed the importance of restorative sleep and stress management in maintaining a healthy weight.  -We discussed insulin resistance and glycemic index as it relates to appetite and weight control  -We discussed the National Weight Control Registry healthy weight maintenance strategies and ways to optimize metabolism.  -We discussed the importance of physical activity including cardiovascular and strength training in maintaining a healthier weight and explored viable options.      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  Eating at the Table: yes  TV is Off During Meals:     Positive Changes Since Last Visit: multiple  Struggling With: plateau    Knowledgeable in Reading Food Labels: yes  Getting Adequate Protein: yes  Sleeping 7-8 hours/day 6-8 hours  Stress management moderate    PHYSICAL ACTIVITY  "PATTERNS:  Cardiovascular: swimming, takes walks  Strength Training: sometimes yoga video    REVIEW OF SYSTEMS    PHYSICAL EXAM: (most recent vitals and today's stated weight)  Vitals: Ht 1.702 m (5' 7.01\")   Wt 79.4 kg (175 lb)   BMI 27.40 kg/m        GEN: Pleasant and in no acute distress  PSYCH: A&OX3,     I have reviewed the note as documented above.  This accurately captures the substance of my conversation with the patient.  Thank you for the opportunity to participate in the care of your patient.    Frida Mathew MD, FAAFP  [facility]  Diplomate, American Board of Obesity Medicine    Total time spent on the date of this encounter doing: chart review, review of test results, patient visit, physical exam, education, counseling, developing plan of care, and documenting = 26 minutes.            "

## 2024-07-21 ENCOUNTER — MYC REFILL (OUTPATIENT)
Dept: SURGERY | Facility: CLINIC | Age: 24
End: 2024-07-21
Payer: COMMERCIAL

## 2024-07-21 ENCOUNTER — MYC REFILL (OUTPATIENT)
Dept: FAMILY MEDICINE | Facility: CLINIC | Age: 24
End: 2024-07-21
Payer: COMMERCIAL

## 2024-07-21 DIAGNOSIS — E28.2 PCOS (POLYCYSTIC OVARIAN SYNDROME): ICD-10-CM

## 2024-07-21 DIAGNOSIS — F98.8 ATTENTION DEFICIT DISORDER, UNSPECIFIED HYPERACTIVITY PRESENCE: ICD-10-CM

## 2024-07-22 RX ORDER — DEXTROAMPHETAMINE SACCHARATE, AMPHETAMINE ASPARTATE MONOHYDRATE, DEXTROAMPHETAMINE SULFATE AND AMPHETAMINE SULFATE 7.5; 7.5; 7.5; 7.5 MG/1; MG/1; MG/1; MG/1
30 CAPSULE, EXTENDED RELEASE ORAL DAILY
Qty: 90 CAPSULE | Refills: 0 | Status: SHIPPED | OUTPATIENT
Start: 2024-07-22

## 2024-07-23 RX ORDER — METFORMIN HCL 500 MG
500 TABLET, EXTENDED RELEASE 24 HR ORAL 2 TIMES DAILY WITH MEALS
Qty: 180 TABLET | Refills: 3 | Status: SHIPPED | OUTPATIENT
Start: 2024-07-23

## 2024-12-19 ENCOUNTER — MYC REFILL (OUTPATIENT)
Dept: SURGERY | Facility: CLINIC | Age: 24
End: 2024-12-19
Payer: COMMERCIAL

## 2024-12-19 DIAGNOSIS — E66.01 CLASS 3 SEVERE OBESITY DUE TO EXCESS CALORIES WITH SERIOUS COMORBIDITY AND BODY MASS INDEX (BMI) OF 40.0 TO 44.9 IN ADULT (H): ICD-10-CM

## 2024-12-19 DIAGNOSIS — E66.813 CLASS 3 SEVERE OBESITY DUE TO EXCESS CALORIES WITH SERIOUS COMORBIDITY AND BODY MASS INDEX (BMI) OF 40.0 TO 44.9 IN ADULT (H): ICD-10-CM

## 2024-12-19 DIAGNOSIS — E28.2 PCOS (POLYCYSTIC OVARIAN SYNDROME): ICD-10-CM

## 2024-12-19 RX ORDER — CYANOCOBALAMIN (VITAMIN B-12) 2500 MCG
2500 TABLET, SUBLINGUAL SUBLINGUAL DAILY
Qty: 90 TABLET | Refills: 3 | Status: SHIPPED | OUTPATIENT
Start: 2024-12-19

## 2024-12-19 RX ORDER — SEMAGLUTIDE 2.4 MG/.75ML
2.4 INJECTION, SOLUTION SUBCUTANEOUS WEEKLY
Qty: 9 ML | Refills: 3 | Status: SHIPPED | OUTPATIENT
Start: 2024-12-19

## 2025-02-21 ENCOUNTER — TELEPHONE (OUTPATIENT)
Dept: SURGERY | Facility: CLINIC | Age: 25
End: 2025-02-21
Payer: COMMERCIAL

## 2025-02-21 NOTE — TELEPHONE ENCOUNTER
Prior Authorization Retail Medication Request    Medication/Dose: Wegovy 2.4mg/0.75ml Auto-injectors.   New/renewal/insurance change PA/secondary ins. PA: renewal?  Previously Tried and Failed:  Pt has been taking this medication with good results.   Rationale:  previous approval good through 9/5/2025--pt was told by insurance company that a PA is needed.     Current weight=170 lb    Insurance   Primary: [employer]   Insurance ID:  [id number]     Pharmacy Information (if different than what is on RX)  Name:  [id number] [place]   Phone:  [phone]   Fax:  [phone]     Clinic Information  Preferred routing pool for dept communication: [phone]

## 2025-02-25 NOTE — TELEPHONE ENCOUNTER
PA Initiation    Medication: WEGOVY 2.4 MG/0.75ML SC SOAJ  Insurance Company: [employer] Non-Specialty PA's - Phone [phone] Fax [phone]  Pharmacy Filling the Rx: [id number] [place] [facility] [address]   Filling Pharmacy Phone: [phone]  Filling Pharmacy Fax: [phone]  Start Date: 2/25/2025

## 2025-02-27 NOTE — TELEPHONE ENCOUNTER
Prior Authorization Approval    Medication: WEGOVY 2.4 MG/0.75ML SC SOAJ  Authorization Effective Date: 2/26/2025  Authorization Expiration Date: 2/25/2026  Reference #: [id number]   Insurance Company: [employer] Non-Specialty PA's - Phone [phone] Fax [phone]  Which Pharmacy is filling the prescription: [id number] - [place], [place] - [address] [address]  Pharmacy Notified: YES  Patient Notified: YES

## 2025-03-02 ENCOUNTER — HEALTH MAINTENANCE LETTER (OUTPATIENT)
Age: 25
End: 2025-03-02

## 2025-05-06 DIAGNOSIS — F98.8 ATTENTION DEFICIT DISORDER, UNSPECIFIED TYPE: Primary | ICD-10-CM

## 2025-05-06 RX ORDER — DEXTROAMPHETAMINE SACCHARATE, AMPHETAMINE ASPARTATE MONOHYDRATE, DEXTROAMPHETAMINE SULFATE AND AMPHETAMINE SULFATE 7.5; 7.5; 7.5; 7.5 MG/1; MG/1; MG/1; MG/1
30 CAPSULE, EXTENDED RELEASE ORAL DAILY
Qty: 30 CAPSULE | Refills: 0 | Status: SHIPPED | OUTPATIENT
Start: 2025-05-06

## 2025-06-03 ENCOUNTER — PATIENT OUTREACH (OUTPATIENT)
Dept: CARE COORDINATION | Facility: CLINIC | Age: 25
End: 2025-06-03
Payer: COMMERCIAL

## 2025-06-23 ENCOUNTER — TELEPHONE (OUTPATIENT)
Dept: SURGERY | Facility: CLINIC | Age: 25
End: 2025-06-23
Payer: COMMERCIAL

## 2025-06-23 VITALS — HEIGHT: 67 IN | WEIGHT: 160 LBS | BODY MASS INDEX: 25.11 KG/M2

## 2025-06-23 NOTE — TELEPHONE ENCOUNTER
Prior Authorization Retail Medication Request    Medication/Dose: Wegovy 2.4 mg weekly pen injectors  New/renewal/insurance change PA/secondary ins. PA: renewal  Rationale:  Previous approval good through 2/25/2026 but the pharmacy is saying they need an updated approval.    Starting weight on 5/30/2025 was 266 lbs, BMI 41.66 kg/m2  Current weight  - 160 lbs, BMI 25.06    Insurance   Primary: [employer]  Insurance ID:  [id number]    Pharmacy Information (if different than what is on RX)  Name:  [facility] [place]  Phone:  [phone]  Fax:[phone]    Clinic Information  Preferred routing pool for dept communication: [facility]ariat[facility]

## 2025-07-03 ENCOUNTER — TELEPHONE (OUTPATIENT)
Dept: FAMILY MEDICINE | Facility: CLINIC | Age: 25
End: 2025-07-03

## 2025-07-03 ENCOUNTER — VIRTUAL VISIT (OUTPATIENT)
Dept: FAMILY MEDICINE | Facility: CLINIC | Age: 25
End: 2025-07-03
Payer: COMMERCIAL

## 2025-07-03 DIAGNOSIS — F90.0 ATTENTION DEFICIT HYPERACTIVITY DISORDER (ADHD), PREDOMINANTLY INATTENTIVE TYPE: Primary | ICD-10-CM

## 2025-07-03 RX ORDER — ATOMOXETINE 40 MG/1
40 CAPSULE ORAL DAILY
Qty: 90 CAPSULE | Refills: 3 | Status: SHIPPED | OUTPATIENT
Start: 2025-07-03

## 2025-07-03 NOTE — PROGRESS NOTES
"Makeda is a 25 year old who is being evaluated via a billable video visit.    How would you like to obtain your AVS? MyChart  If the video visit is dropped, the invitation should be resent by: Send to e-mail at: [email or web address]  Will anyone else be joining your video visit? No      Assessment & Plan       ICD-10-CM    1. Attention deficit hyperactivity disorder (ADHD), predominantly inattentive type  F90.0 atomoxetine (STRATTERA) 40 MG capsule        Patient will stop the Adderall XR 30 mg daily.    Prescribed Strattera 40 mg daily.    She will try to give it a good 1 month to see if this works well for her.    If the med does not work well we can change back to Adderall and maybe start at 20 mg instead or go up to 30 if she needs.    We will help set a virtual visit for med check in 1 month.    We will set up physical in 6 months.    At the physical we will do the controlled substance agreement and urine tox.    The longitudinal plan of care for the diagnosis(es)/condition(s) as documented were addressed during this visit. Due to the added complexity in care, I will continue to support Makeda in the subsequent management and with ongoing continuity of care.  Helping to manage her ADHD.          BMI  Estimated body mass index is 25.06 kg/m  as calculated from the following:    Height as of 6/23/25: 1.702 m (5' 7\").    Weight as of 6/23/25: 72.6 kg (160 lb).   Weight management plan: Discussed healthy diet and exercise guidelines          Subjective   Makeda is a 25 year old, presenting for the following health issues:  Recheck Medication (Med check)        7/3/2025     4:30 PM   Additional Questions   Roomed by Rocio diaz CMA   Accompanied by self     History of Present Illness       Reason for visit:  Medcheck She is missing 3 dose(s) of medications per week.  She is not taking prescribed medications regularly due to side effects.                  ADHD: Patient is on Adderall XR 30 mg.  She feels " that when she takes the medication she has increased anxiety and hates everyone and everything around her.  When she does not take the medication she does not have the symptoms.  She wonders if should be on a different medication.  She denies symptoms of depression or anxiety when not on the medication.  No suicidal or homicidal ideation.       Objective           Vitals:  No vitals were obtained today due to virtual visit.    Physical Exam   GENERAL: alert and no distress            Video-Visit Details    Type of service:  Video Visit   Originating Location (pt. Location): Home    Distant Location (provider location):  On-site  Platform used for Video Visit: [employer]  Signed Electronically by: Anna Martinez MD

## 2025-07-03 NOTE — PATIENT INSTRUCTIONS
Patient will stop the Adderall XR 30 mg daily.    Prescribed Strattera 40 mg daily.    She will try to give it a good 1 month to see if this works well for her.    If the med does not work well we can change back to Adderall and maybe start at 20 mg instead or go up to 30 if she needs.    We will help set a virtual visit for med check in 1 month.    We will set up physical in 6 months.    At the physical we will do the controlled substance agreement and urine tox.

## 2025-07-03 NOTE — TELEPHONE ENCOUNTER
LMTCB -  Please assist in scheduling a follow up virtual visit with Dr. Martinez in 1 month. Then an in person physical in 3-6 months. Thanks!

## (undated) DEVICE — ENDO TROCAR FIRST ENTRY KII FIOS Z-THRD 05X100MM CTF03

## (undated) DEVICE — SYR 50ML SLIP TIP W/O NDL 309654

## (undated) DEVICE — DAVINCI XI CLIP APPLIER LG HEM-O-CLIP 8MM 470230

## (undated) DEVICE — BLADE CLIPPER SGL USE 9680

## (undated) DEVICE — DAVINCI XI DRAPE ARM 470015

## (undated) DEVICE — CLIP ENDO HEMO-LOC PURPLE LG 544240

## (undated) DEVICE — TAPE MEDIPORE 4"X2YD 2864

## (undated) DEVICE — PACK LAP CHOLE CUSTOM ASC

## (undated) DEVICE — DRSG STERI STRIP 1/2X4" R1547

## (undated) DEVICE — SU VICRYL 0 CT-2 27" J334H

## (undated) DEVICE — DAVINCI XI CAUTERY HOOK 470183

## (undated) DEVICE — DAVINCI XI FCP BIPOLAR FENESTRATED 470205

## (undated) DEVICE — LINEN TOWEL PACK X5 5464

## (undated) DEVICE — DAVINCI XI SEAL UNIVERSAL 5-8MM 470361

## (undated) DEVICE — SOL WATER IRRIG 500ML BOTTLE 2F7113

## (undated) DEVICE — DAVINCI XI DRAPE COLUMN 470341

## (undated) DEVICE — ESU PENCIL W/HOLSTER

## (undated) DEVICE — DRAPE MAYO STAND 23X54 8337

## (undated) DEVICE — GOWN XLG DISP 9545

## (undated) DEVICE — GLOVE BIOGEL PI MICRO SZ 7.5 48575

## (undated) DEVICE — SU MONOCRYL 4-0 PS-2 27" UND Y426H

## (undated) DEVICE — ESU GROUND PAD ADULT W/CORD E7507

## (undated) DEVICE — PREP CHLORAPREP 26ML TINTED ORANGE  260815

## (undated) DEVICE — SPECIMEN CONTAINER W/10% BUFFERED FORMALIN 120ML 591201

## (undated) RX ORDER — SCOLOPAMINE TRANSDERMAL SYSTEM 1 MG/1
PATCH, EXTENDED RELEASE TRANSDERMAL
Status: DISPENSED
Start: 2023-10-13

## (undated) RX ORDER — FENTANYL CITRATE 50 UG/ML
INJECTION, SOLUTION INTRAMUSCULAR; INTRAVENOUS
Status: DISPENSED
Start: 2023-10-13

## (undated) RX ORDER — OXYCODONE HYDROCHLORIDE 5 MG/1
TABLET ORAL
Status: DISPENSED
Start: 2023-10-13

## (undated) RX ORDER — DEXAMETHASONE SODIUM PHOSPHATE 4 MG/ML
INJECTION, SOLUTION INTRA-ARTICULAR; INTRALESIONAL; INTRAMUSCULAR; INTRAVENOUS; SOFT TISSUE
Status: DISPENSED
Start: 2023-10-13

## (undated) RX ORDER — HYDROMORPHONE HYDROCHLORIDE 1 MG/ML
INJECTION, SOLUTION INTRAMUSCULAR; INTRAVENOUS; SUBCUTANEOUS
Status: DISPENSED
Start: 2023-10-13

## (undated) RX ORDER — ACETAMINOPHEN 325 MG/1
TABLET ORAL
Status: DISPENSED
Start: 2023-10-13

## (undated) RX ORDER — DEXMEDETOMIDINE HYDROCHLORIDE 4 UG/ML
INJECTION, SOLUTION INTRAVENOUS
Status: DISPENSED
Start: 2023-10-13

## (undated) RX ORDER — INDOCYANINE GREEN AND WATER 25 MG
KIT INJECTION
Status: DISPENSED
Start: 2023-10-13

## (undated) RX ORDER — CEFAZOLIN SODIUM 2 G/50ML
SOLUTION INTRAVENOUS
Status: DISPENSED
Start: 2023-10-13

## (undated) RX ORDER — ONDANSETRON 2 MG/ML
INJECTION INTRAMUSCULAR; INTRAVENOUS
Status: DISPENSED
Start: 2023-10-13

## (undated) RX ORDER — PROPOFOL 10 MG/ML
INJECTION, EMULSION INTRAVENOUS
Status: DISPENSED
Start: 2023-10-13